# Patient Record
Sex: FEMALE | Race: WHITE | Employment: UNEMPLOYED | URBAN - NONMETROPOLITAN AREA
[De-identification: names, ages, dates, MRNs, and addresses within clinical notes are randomized per-mention and may not be internally consistent; named-entity substitution may affect disease eponyms.]

---

## 2017-05-11 ENCOUNTER — HOSPITAL ENCOUNTER (OUTPATIENT)
Age: 52
Setting detail: SPECIMEN
Discharge: HOME | End: 2017-05-11
Payer: COMMERCIAL

## 2017-05-11 ENCOUNTER — OFFICE VISIT (OUTPATIENT)
Dept: OBGYN CLINIC | Age: 52
End: 2017-05-11
Payer: COMMERCIAL

## 2017-05-11 VITALS
SYSTOLIC BLOOD PRESSURE: 131 MMHG | HEIGHT: 67 IN | WEIGHT: 293 LBS | DIASTOLIC BLOOD PRESSURE: 85 MMHG | BODY MASS INDEX: 45.99 KG/M2 | HEART RATE: 73 BPM

## 2017-05-11 DIAGNOSIS — Z01.419 WOMEN'S ANNUAL ROUTINE GYNECOLOGICAL EXAMINATION: Primary | ICD-10-CM

## 2017-05-11 DIAGNOSIS — Z01.419 WOMEN'S ANNUAL ROUTINE GYNECOLOGICAL EXAMINATION: ICD-10-CM

## 2017-05-11 DIAGNOSIS — Z12.39 SCREENING FOR BREAST CANCER: ICD-10-CM

## 2017-05-11 PROCEDURE — 99396 PREV VISIT EST AGE 40-64: CPT | Performed by: OBSTETRICS & GYNECOLOGY

## 2017-05-11 PROCEDURE — G0145 SCR C/V CYTO,THINLAYER,RESCR: HCPCS

## 2017-05-17 LAB — CYTOLOGY REPORT: NORMAL

## 2017-05-25 RX ORDER — QUINAPRIL 40 MG/1
TABLET ORAL
Qty: 90 TABLET | Refills: 1 | Status: SHIPPED | OUTPATIENT
Start: 2017-05-25 | End: 2017-10-16 | Stop reason: SDUPTHER

## 2017-05-25 RX ORDER — BUPROPION HYDROCHLORIDE 300 MG/1
TABLET ORAL
Qty: 90 TABLET | Refills: 1 | Status: SHIPPED | OUTPATIENT
Start: 2017-05-25 | End: 2017-10-16 | Stop reason: SDUPTHER

## 2017-05-25 RX ORDER — DILTIAZEM HYDROCHLORIDE 240 MG/1
CAPSULE, EXTENDED RELEASE ORAL
Qty: 90 CAPSULE | Refills: 1 | Status: SHIPPED | OUTPATIENT
Start: 2017-05-25 | End: 2017-10-16 | Stop reason: SDUPTHER

## 2017-05-25 RX ORDER — HYDROCHLOROTHIAZIDE 25 MG/1
TABLET ORAL
Qty: 90 TABLET | Refills: 1 | Status: SHIPPED | OUTPATIENT
Start: 2017-05-25 | End: 2017-10-16 | Stop reason: SDUPTHER

## 2017-05-25 RX ORDER — POTASSIUM CHLORIDE 750 MG/1
TABLET, EXTENDED RELEASE ORAL
Qty: 180 TABLET | Refills: 1 | Status: SHIPPED | OUTPATIENT
Start: 2017-05-25 | End: 2017-10-16 | Stop reason: SDUPTHER

## 2017-06-02 ENCOUNTER — HOSPITAL ENCOUNTER (OUTPATIENT)
Dept: MAMMOGRAPHY | Age: 52
Discharge: HOME | End: 2017-06-02
Payer: COMMERCIAL

## 2017-06-02 DIAGNOSIS — Z12.39 SCREENING FOR BREAST CANCER: ICD-10-CM

## 2017-06-02 PROCEDURE — G0202 SCR MAMMO BI INCL CAD: HCPCS

## 2017-10-17 RX ORDER — HYDROCHLOROTHIAZIDE 25 MG/1
TABLET ORAL
Qty: 90 TABLET | Refills: 0 | Status: SHIPPED | OUTPATIENT
Start: 2017-10-17 | End: 2018-02-05 | Stop reason: SDUPTHER

## 2017-10-17 RX ORDER — QUINAPRIL 40 MG/1
TABLET ORAL
Qty: 90 TABLET | Refills: 0 | Status: SHIPPED | OUTPATIENT
Start: 2017-10-17 | End: 2018-02-05 | Stop reason: SDUPTHER

## 2017-10-17 RX ORDER — DILTIAZEM HYDROCHLORIDE 240 MG/1
CAPSULE, EXTENDED RELEASE ORAL
Qty: 90 CAPSULE | Refills: 0 | Status: SHIPPED | OUTPATIENT
Start: 2017-10-17 | End: 2018-02-05 | Stop reason: SDUPTHER

## 2017-10-17 RX ORDER — BUPROPION HYDROCHLORIDE 300 MG/1
TABLET ORAL
Qty: 90 TABLET | Refills: 0 | Status: SHIPPED | OUTPATIENT
Start: 2017-10-17 | End: 2018-02-05 | Stop reason: SDUPTHER

## 2017-10-17 RX ORDER — POTASSIUM CHLORIDE 750 MG/1
TABLET, EXTENDED RELEASE ORAL
Qty: 180 TABLET | Refills: 0 | Status: SHIPPED | OUTPATIENT
Start: 2017-10-17 | End: 2018-02-05 | Stop reason: SDUPTHER

## 2017-10-19 ENCOUNTER — OFFICE VISIT (OUTPATIENT)
Dept: FAMILY MEDICINE CLINIC | Age: 52
End: 2017-10-19
Payer: COMMERCIAL

## 2017-10-19 VITALS
OXYGEN SATURATION: 97 % | DIASTOLIC BLOOD PRESSURE: 82 MMHG | WEIGHT: 293 LBS | SYSTOLIC BLOOD PRESSURE: 130 MMHG | HEART RATE: 78 BPM | BODY MASS INDEX: 57.48 KG/M2

## 2017-10-19 DIAGNOSIS — R63.5 WEIGHT GAIN: ICD-10-CM

## 2017-10-19 DIAGNOSIS — I10 ESSENTIAL HYPERTENSION: ICD-10-CM

## 2017-10-19 DIAGNOSIS — J45.20 MILD INTERMITTENT ASTHMA WITHOUT COMPLICATION: ICD-10-CM

## 2017-10-19 DIAGNOSIS — Z23 NEED FOR 23-POLYVALENT PNEUMOCOCCAL POLYSACCHARIDE VACCINE: ICD-10-CM

## 2017-10-19 DIAGNOSIS — F34.1 DYSTHYMIA: Primary | ICD-10-CM

## 2017-10-19 PROCEDURE — 90732 PPSV23 VACC 2 YRS+ SUBQ/IM: CPT | Performed by: FAMILY MEDICINE

## 2017-10-19 PROCEDURE — 90471 IMMUNIZATION ADMIN: CPT | Performed by: FAMILY MEDICINE

## 2017-10-19 PROCEDURE — 99214 OFFICE O/P EST MOD 30 MIN: CPT | Performed by: FAMILY MEDICINE

## 2017-10-19 NOTE — PROGRESS NOTES
HPI Notes    Name: Tawny Larkin  : 1965         Chief Complaint:     Chief Complaint   Patient presents with    Depression       History of Present Illness:      Tawny Larkin is a 46 y.o.  female who presents with Depression      Mental Health Problem   The primary symptoms include dysphoric mood. The current episode started more than 1 month ago. This is a chronic problem. The degree of incapacity that she is experiencing as a consequence of her illness is moderate. Sequelae of the illness include harmed interpersonal relations. Additional symptoms of the illness include anhedonia, unexpected weight change, fatigue and attention impairment. Additional symptoms of the illness do not include appetite change, headaches or abdominal pain. She does not admit to suicidal ideas. She does not have a plan to commit suicide. She does not contemplate harming herself. She has not already injured self. She does not contemplate injuring another person. She has not already  injured another person. Hypertension   This is a chronic problem. The current episode started more than 1 year ago. The problem is unchanged. The problem is controlled. Pertinent negatives include no chest pain, headaches, neck pain, palpitations or shortness of breath. Risk factors for coronary artery disease include obesity. Past treatments include ACE inhibitors, calcium channel blockers and diuretics. The current treatment provides significant improvement. Compliance problems include exercise and diet. Asthma   She complains of cough. There is no shortness of breath or wheezing. This is a chronic problem. The current episode started more than 1 year ago. The problem occurs intermittently. The problem has been unchanged. The cough is non-productive. Pertinent negatives include no appetite change, chest pain, ear pain, fever, headaches, myalgias or trouble swallowing.  Her symptoms are aggravated by exposure to fumes and exposure to smoke. Her symptoms are alleviated by beta-agonist. She reports significant improvement on treatment. Her past medical history is significant for asthma. Past Medical History:     Past Medical History:   Diagnosis Date    Acid reflux     Allergic rhinitis     Asthma     Fibromyalgia     Headaches, cluster     Hypertension     Osteoarthritis     hips, shoulders    Spondylolisthesis at L4-L5 level     Unspecified sleep apnea     Urinary incontinence 2011    stress incontinence      Reviewed all health maintenance requirements and ordered appropriate tests  There are no preventive care reminders to display for this patient. Past Surgical History:     Past Surgical History:   Procedure Laterality Date    COLONOSCOPY      Dr. Rian Kirby          Medications:       Prior to Admission medications    Medication Sig Start Date End Date Taking? Authorizing Provider   buPROPion (WELLBUTRIN XL) 300 MG extended release tablet TAKE 1 TABLET EVERY MORNING 10/17/17  Yes Preeti Speak, DO   DILT- MG extended release capsule TAKE 1 CAPSULE DAILY 10/17/17  Yes Preeti Speak, DO   potassium chloride (KLOR-CON M) 10 MEQ extended release tablet TAKE 1 TABLET TWICE A DAY 10/17/17  Yes Preeti Speak, DO   hydrochlorothiazide (HYDRODIURIL) 25 MG tablet TAKE 1 TABLET DAILY 10/17/17  Yes Preeti Speak, DO   quinapril (ACCUPRIL) 40 MG tablet TAKE 1 TABLET NIGHTLY 10/17/17  Yes Preeti Speak, DO   Naproxen Sodium (ALEVE PO) Take by mouth Indications: three times daily   Yes Historical Provider, MD   PROVENTIL  (90 BASE) MCG/ACT inhaler Take 1 puff by mouth Prn 2/4/16  Yes Historical Provider, MD   fluticasone (FLONASE) 50 MCG/ACT nasal spray  4/3/15  Yes Historical Provider, MD   CALCIUM PO Take  by mouth. Yes Historical Provider, MD   MAGNESIUM PO Take  by mouth. Yes Historical Provider, MD   KRILL OIL PO Take  by mouth.    Yes Historical Provider, MD Ebony James 250-50 MCG/DOSE AEPB Inhale 1 puff into the lungs 2 times daily. 7/31/12  Yes Historical Provider, MD   montelukast (SINGULAIR) 10 MG tablet Take 10 mg by mouth nightly. Yes Historical Provider, MD   Cetirizine HCl (ZYRTEC ALLERGY PO) Take  by mouth. One daily   Yes Historical Provider, MD   Omeprazole (PRILOSEC PO) Take  by mouth. Yes Historical Provider, MD   metoclopramide (REGLAN) 10 MG tablet Take 10 mg by mouth 4 times daily. Yes Historical Provider, MD   aspirin 81 MG EC tablet Take 81 mg by mouth daily. Takes 2 daily   Yes Historical Provider, MD   potassium chloride (K-DUR) 10 MEQ tablet TAKE 1 TABLET DAILY 5/16/13 9/5/13  David Otoole DO   diltiazem (CARDIZEM CD) 240 MG ER capsule Take 1 capsule by mouth daily. 10/4/12 5/16/13  Shaquille Otoole DO        Allergies:       Review of patient's allergies indicates no known allergies. Social History:     Tobacco:    reports that she has never smoked. She has never used smokeless tobacco.  Alcohol:      reports that she drinks alcohol. Drug Use:  reports that she does not use drugs. Family History:     Family History   Problem Relation Age of Onset    Arthritis Father      rhuematoid    Thyroid Disease Sister        Review of Systems:     Positive and Negative as described in HPI    Review of Systems   Constitutional: Positive for fatigue and unexpected weight change. Negative for activity change, appetite change and fever. HENT: Negative for ear discharge, ear pain and trouble swallowing. Eyes: Negative for photophobia and visual disturbance. Respiratory: Positive for cough. Negative for shortness of breath and wheezing. Cardiovascular: Negative for chest pain and palpitations. Gastrointestinal: Negative for abdominal distention, abdominal pain, constipation, diarrhea, nausea and vomiting. Endocrine: Negative for polydipsia, polyphagia and polyuria. Genitourinary: Negative for frequency and urgency.    Musculoskeletal: Negative (PNEUMOVAX 23)    Lipid Panel     Standing Status:   Future     Number of Occurrences:   1     Standing Expiration Date:   10/19/2018     Order Specific Question:   Is Patient Fasting?/# of Hours     Answer:   12    Comprehensive Metabolic Panel     Standing Status:   Future     Number of Occurrences:   1     Standing Expiration Date:   10/19/2018    TSH With Reflex Ft4     Standing Status:   Future     Number of Occurrences:   1     Standing Expiration Date:   10/19/2018         Patient Instructions     SURVEY:    You may be receiving a survey from [employer] regarding your visit today. Please complete the survey to enable us to provide the highest quality of care to you and your family. If you cannot score us as very good on any question, please call the office to discuss how we could have made your experience exceptional.     Thank you. Electronically signed by Pedro Galvez DO on 11/5/2017 at 5:26 PM         Completed Refills   Requested Prescriptions      No prescriptions requested or ordered in this encounter         Judit Casey received counseling on the following healthy behaviors: nutrition and exercise  Reviewed prior labs and health maintenance. Continue current medications, diet and exercise. Discussed use, benefit, and side effects of prescribed medications. Barriers to medication compliance addressed. Patient given educational materials - see patient instructions. All patient questions answered. Patient voiced understanding.

## 2017-11-02 ENCOUNTER — HOSPITAL ENCOUNTER (OUTPATIENT)
Age: 52
Discharge: HOME | End: 2017-11-02
Payer: COMMERCIAL

## 2017-11-02 DIAGNOSIS — I10 ESSENTIAL HYPERTENSION: ICD-10-CM

## 2017-11-02 DIAGNOSIS — R63.5 WEIGHT GAIN: ICD-10-CM

## 2017-11-02 DIAGNOSIS — F34.1 DYSTHYMIA: ICD-10-CM

## 2017-11-02 LAB
ALBUMIN SERPL-MCNC: 4.4 G/DL (ref 3.5–5.2)
ALBUMIN/GLOBULIN RATIO: ABNORMAL (ref 1–2.5)
ALP BLD-CCNC: 83 U/L (ref 35–104)
ALT SERPL-CCNC: 16 U/L (ref 5–33)
ANION GAP SERPL CALCULATED.3IONS-SCNC: 14 MMOL/L (ref 9–17)
AST SERPL-CCNC: 13 U/L
BILIRUB SERPL-MCNC: 0.54 MG/DL (ref 0.3–1.2)
BUN BLDV-MCNC: 16 MG/DL (ref 6–20)
BUN/CREAT BLD: 20 (ref 9–20)
CALCIUM SERPL-MCNC: 9.5 MG/DL (ref 8.6–10.4)
CHLORIDE BLD-SCNC: 101 MMOL/L (ref 98–107)
CHOLESTEROL/HDL RATIO: 3.6
CHOLESTEROL: 207 MG/DL
CO2: 25 MMOL/L (ref 20–31)
CREAT SERPL-MCNC: 0.81 MG/DL (ref 0.5–0.9)
GFR AFRICAN AMERICAN: >60 ML/MIN
GFR NON-AFRICAN AMERICAN: >60 ML/MIN
GFR SERPL CREATININE-BSD FRML MDRD: ABNORMAL ML/MIN/{1.73_M2}
GFR SERPL CREATININE-BSD FRML MDRD: ABNORMAL ML/MIN/{1.73_M2}
GLUCOSE BLD-MCNC: 118 MG/DL (ref 70–99)
HDLC SERPL-MCNC: 58 MG/DL
LDL CHOLESTEROL: 130 MG/DL (ref 0–130)
PATIENT FASTING?: YES
POTASSIUM SERPL-SCNC: 4.1 MMOL/L (ref 3.7–5.3)
SODIUM BLD-SCNC: 140 MMOL/L (ref 135–144)
TOTAL PROTEIN: 7.4 G/DL (ref 6.4–8.3)
TRIGL SERPL-MCNC: 94 MG/DL
TSH SERPL DL<=0.05 MIU/L-ACNC: 1.91 MIU/L (ref 0.3–5)
VLDLC SERPL CALC-MCNC: ABNORMAL MG/DL (ref 1–30)

## 2017-11-02 PROCEDURE — 80053 COMPREHEN METABOLIC PANEL: CPT

## 2017-11-02 PROCEDURE — 84443 ASSAY THYROID STIM HORMONE: CPT

## 2017-11-02 PROCEDURE — 36415 COLL VENOUS BLD VENIPUNCTURE: CPT

## 2017-11-02 PROCEDURE — 80061 LIPID PANEL: CPT

## 2017-11-05 ASSESSMENT — ENCOUNTER SYMPTOMS
BACK PAIN: 0
CONSTIPATION: 0
VOMITING: 0
SHORTNESS OF BREATH: 0
ABDOMINAL PAIN: 0
TROUBLE SWALLOWING: 0
WHEEZING: 0
PHOTOPHOBIA: 0
COUGH: 1
NAUSEA: 0
ABDOMINAL DISTENTION: 0
COLOR CHANGE: 0
DIARRHEA: 0

## 2017-12-27 ENCOUNTER — TELEPHONE (OUTPATIENT)
Dept: FAMILY MEDICINE CLINIC | Age: 52
End: 2017-12-27

## 2017-12-27 RX ORDER — OSELTAMIVIR PHOSPHATE 75 MG/1
75 CAPSULE ORAL 2 TIMES DAILY
Qty: 10 CAPSULE | Refills: 0 | Status: SHIPPED | OUTPATIENT
Start: 2017-12-27 | End: 2018-01-01

## 2017-12-27 NOTE — TELEPHONE ENCOUNTER
James Darby had been around her daughter and granddaughter all weekend who had tested positive for Influenzae. She said last night she started developing flu like symptoms. She has the cough, wheezing, sinus pain, body aches, and fever. She would like to know if we could call tamiflu in for her. Please let James Darby know. RA-Daisy    Health Maintenance   Topic Date Due    Colon cancer screen colonoscopy  05/05/2019    Breast cancer screen  06/02/2019    Diabetes screen  08/12/2019    Cervical cancer screen  05/11/2020    Lipid screen  11/02/2022    DTaP/Tdap/Td vaccine (2 - Td) 09/12/2026    Flu vaccine  Completed    Pneumococcal med risk  Completed    Hepatitis C screen  Addressed    HIV screen  Addressed             (applicable per patient's age: Cancer Screenings, Depression Screening, Fall Risk Screening, Immunizations)    Hemoglobin A1C (%)   Date Value   08/12/2016 5.3     LDL Cholesterol (mg/dL)   Date Value   11/02/2017 130     AST (U/L)   Date Value   11/02/2017 13     ALT (U/L)   Date Value   11/02/2017 16     BUN (mg/dL)   Date Value   11/02/2017 16      (goal A1C is < 7)   (goal LDL is <100) need 30-50% reduction from baseline     BP Readings from Last 3 Encounters:   10/19/17 130/82   05/11/17 131/85   08/05/16 102/64    (goal /80)      All Future Testing planned in [facility]:      Next Visit Date:  No future appointments.          Patient Active Problem List:     HTN (hypertension)     Asthma

## 2018-02-06 RX ORDER — DILTIAZEM HYDROCHLORIDE 240 MG/1
CAPSULE, EXTENDED RELEASE ORAL
Qty: 90 CAPSULE | Refills: 0 | Status: SHIPPED | OUTPATIENT
Start: 2018-02-06 | End: 2018-05-01 | Stop reason: SDUPTHER

## 2018-02-06 RX ORDER — POTASSIUM CHLORIDE 750 MG/1
TABLET, EXTENDED RELEASE ORAL
Qty: 180 TABLET | Refills: 0 | Status: SHIPPED | OUTPATIENT
Start: 2018-02-06 | End: 2018-05-01 | Stop reason: SDUPTHER

## 2018-02-06 RX ORDER — BUPROPION HYDROCHLORIDE 300 MG/1
TABLET ORAL
Qty: 90 TABLET | Refills: 0 | Status: SHIPPED | OUTPATIENT
Start: 2018-02-06 | End: 2018-05-01 | Stop reason: SDUPTHER

## 2018-02-06 RX ORDER — QUINAPRIL 40 MG/1
TABLET ORAL
Qty: 90 TABLET | Refills: 0 | Status: SHIPPED | OUTPATIENT
Start: 2018-02-06 | End: 2018-05-01 | Stop reason: SDUPTHER

## 2018-02-06 RX ORDER — HYDROCHLOROTHIAZIDE 25 MG/1
TABLET ORAL
Qty: 90 TABLET | Refills: 0 | Status: SHIPPED | OUTPATIENT
Start: 2018-02-06 | End: 2018-05-01 | Stop reason: SDUPTHER

## 2018-05-01 ENCOUNTER — OFFICE VISIT (OUTPATIENT)
Dept: FAMILY MEDICINE CLINIC | Age: 53
End: 2018-05-01
Payer: COMMERCIAL

## 2018-05-01 VITALS
WEIGHT: 293 LBS | HEART RATE: 74 BPM | DIASTOLIC BLOOD PRESSURE: 80 MMHG | BODY MASS INDEX: 57.95 KG/M2 | OXYGEN SATURATION: 97 % | SYSTOLIC BLOOD PRESSURE: 118 MMHG

## 2018-05-01 DIAGNOSIS — I10 ESSENTIAL HYPERTENSION: Primary | ICD-10-CM

## 2018-05-01 DIAGNOSIS — F34.1 DYSTHYMIA: ICD-10-CM

## 2018-05-01 DIAGNOSIS — J45.20 MILD INTERMITTENT ASTHMA WITHOUT COMPLICATION: ICD-10-CM

## 2018-05-01 PROCEDURE — 99214 OFFICE O/P EST MOD 30 MIN: CPT | Performed by: FAMILY MEDICINE

## 2018-05-01 RX ORDER — BUPROPION HYDROCHLORIDE 300 MG/1
TABLET ORAL
Qty: 90 TABLET | Refills: 2 | Status: SHIPPED | OUTPATIENT
Start: 2018-05-01 | End: 2018-11-30 | Stop reason: SDUPTHER

## 2018-05-01 RX ORDER — QUINAPRIL 40 MG/1
TABLET ORAL
Qty: 90 TABLET | Refills: 2 | Status: SHIPPED | OUTPATIENT
Start: 2018-05-01 | End: 2018-11-30 | Stop reason: SDUPTHER

## 2018-05-01 RX ORDER — POTASSIUM CHLORIDE 750 MG/1
TABLET, EXTENDED RELEASE ORAL
Qty: 180 TABLET | Refills: 2 | Status: SHIPPED | OUTPATIENT
Start: 2018-05-01 | End: 2018-11-30 | Stop reason: SDUPTHER

## 2018-05-01 RX ORDER — DILTIAZEM HYDROCHLORIDE 240 MG/1
CAPSULE, EXTENDED RELEASE ORAL
Qty: 90 CAPSULE | Refills: 2 | Status: SHIPPED | OUTPATIENT
Start: 2018-05-01 | End: 2018-11-30 | Stop reason: SDUPTHER

## 2018-05-01 RX ORDER — HYDROCHLOROTHIAZIDE 25 MG/1
TABLET ORAL
Qty: 90 TABLET | Refills: 2 | Status: SHIPPED | OUTPATIENT
Start: 2018-05-01 | End: 2018-11-30 | Stop reason: SDUPTHER

## 2018-05-01 ASSESSMENT — PATIENT HEALTH QUESTIONNAIRE - PHQ9
SUM OF ALL RESPONSES TO PHQ QUESTIONS 1-9: 2
2. FEELING DOWN, DEPRESSED OR HOPELESS: 1
1. LITTLE INTEREST OR PLEASURE IN DOING THINGS: 1
SUM OF ALL RESPONSES TO PHQ9 QUESTIONS 1 & 2: 2

## 2018-05-04 ASSESSMENT — ENCOUNTER SYMPTOMS
PHOTOPHOBIA: 0
ORTHOPNEA: 0
BLURRED VISION: 0
BACK PAIN: 0
VOMITING: 0
SHORTNESS OF BREATH: 0
CONSTIPATION: 0
WHEEZING: 1
COUGH: 1
ABDOMINAL PAIN: 0
DIARRHEA: 0
COLOR CHANGE: 0
TROUBLE SWALLOWING: 0
ABDOMINAL DISTENTION: 0
NAUSEA: 0

## 2018-06-14 ENCOUNTER — OFFICE VISIT (OUTPATIENT)
Dept: FAMILY MEDICINE CLINIC | Age: 53
End: 2018-06-14
Payer: COMMERCIAL

## 2018-06-14 VITALS
BODY MASS INDEX: 59.36 KG/M2 | DIASTOLIC BLOOD PRESSURE: 84 MMHG | HEART RATE: 80 BPM | OXYGEN SATURATION: 96 % | SYSTOLIC BLOOD PRESSURE: 124 MMHG | TEMPERATURE: 98.9 F | WEIGHT: 293 LBS

## 2018-06-14 DIAGNOSIS — S39.011A STRAIN OF ABDOMINAL WALL, INITIAL ENCOUNTER: Primary | ICD-10-CM

## 2018-06-14 PROCEDURE — 99213 OFFICE O/P EST LOW 20 MIN: CPT | Performed by: FAMILY MEDICINE

## 2018-06-14 ASSESSMENT — ENCOUNTER SYMPTOMS
SORE THROAT: 0
CONSTIPATION: 0
VOMITING: 0
NAUSEA: 0
RHINORRHEA: 1
COUGH: 1
ABDOMINAL PAIN: 1
BLOOD IN STOOL: 0
DIARRHEA: 0

## 2018-10-09 ENCOUNTER — HOSPITAL ENCOUNTER (OUTPATIENT)
Age: 53
Setting detail: SPECIMEN
Discharge: HOME | End: 2018-10-09
Payer: COMMERCIAL

## 2018-10-09 ENCOUNTER — OFFICE VISIT (OUTPATIENT)
Dept: OBGYN CLINIC | Age: 53
End: 2018-10-09
Payer: COMMERCIAL

## 2018-10-09 VITALS
HEIGHT: 68 IN | BODY MASS INDEX: 44.41 KG/M2 | WEIGHT: 293 LBS | SYSTOLIC BLOOD PRESSURE: 134 MMHG | DIASTOLIC BLOOD PRESSURE: 74 MMHG

## 2018-10-09 DIAGNOSIS — Z01.419 WOMEN'S ANNUAL ROUTINE GYNECOLOGICAL EXAMINATION: Primary | ICD-10-CM

## 2018-10-09 DIAGNOSIS — Z01.419 WOMEN'S ANNUAL ROUTINE GYNECOLOGICAL EXAMINATION: ICD-10-CM

## 2018-10-09 DIAGNOSIS — Z12.39 SCREENING FOR BREAST CANCER: ICD-10-CM

## 2018-10-09 PROCEDURE — G0145 SCR C/V CYTO,THINLAYER,RESCR: HCPCS

## 2018-10-09 PROCEDURE — 99396 PREV VISIT EST AGE 40-64: CPT | Performed by: OBSTETRICS & GYNECOLOGY

## 2018-10-09 RX ORDER — INFLUENZA VIRUS VACCINE 15; 15; 15; 15 UG/.5ML; UG/.5ML; UG/.5ML; UG/.5ML
SUSPENSION INTRAMUSCULAR
Refills: 0 | COMMUNITY
Start: 2018-09-26 | End: 2018-11-30 | Stop reason: ALTCHOICE

## 2018-10-09 NOTE — PROGRESS NOTES
YEARLY PHYSICAL    Date of service: 10/9/2018    Meño Fabian  Is a 48 y.o.   female    PT's PCP is: Liana Fuentes DO     : 1965                                             Subjective:       Patient's last menstrual period was 2015. Are your menses regular: not applicable    OB History    Para Term  AB Living   2 2 2     2   SAB TAB Ectopic Molar Multiple Live Births                    # Outcome Date GA Lbr Vipin/2nd Weight Sex Delivery Anes PTL Lv   2 Term            1 Term                    History   Smoking Status    Never Smoker   Smokeless Tobacco    Never Used        History   Alcohol Use    0.0 oz/week     Comment: one drink monthly       Family History   Problem Relation Age of Onset    Arthritis Father         rhuematoid    Thyroid Disease Sister        Allergies: Patient has no known allergies.       Current Outpatient Prescriptions:     FLUARIX QUADRIVALENT 0.5 ML injection, inject 0.5 milliliter intramuscularly, Disp: , Rfl: 0    SHINGRIX 50 MCG SUSR injection, inject 0.5 milliliter intramuscularly, Disp: , Rfl: 0    quinapril (ACCUPRIL) 40 MG tablet, TAKE 1 TABLET NIGHTLY, Disp: 90 tablet, Rfl: 2    buPROPion (WELLBUTRIN XL) 300 MG extended release tablet, TAKE 1 TABLET EVERY MORNING, Disp: 90 tablet, Rfl: 2    potassium chloride (KLOR-CON M) 10 MEQ extended release tablet, TAKE 1 TABLET TWICE A DAY, Disp: 180 tablet, Rfl: 2    hydrochlorothiazide (HYDRODIURIL) 25 MG tablet, TAKE 1 TABLET DAILY, Disp: 90 tablet, Rfl: 2    diltiazem (DILT-XR) 240 MG extended release capsule, TAKE 1 CAPSULE DAILY, Disp: 90 capsule, Rfl: 2    Naproxen Sodium (ALEVE PO), Take by mouth Indications: three times daily, Disp: , Rfl:     PROVENTIL  (90 BASE) MCG/ACT inhaler, Take 1 puff by mouth Prn, Disp: , Rfl:     fluticasone (FLONASE) 50 MCG/ACT nasal spray, , Disp: , Rfl:     CALCIUM PO,

## 2018-10-25 LAB — CYTOLOGY REPORT: NORMAL

## 2018-10-26 ENCOUNTER — HOSPITAL ENCOUNTER (OUTPATIENT)
Dept: MAMMOGRAPHY | Age: 53
Discharge: HOME | End: 2018-10-28
Payer: COMMERCIAL

## 2018-10-26 DIAGNOSIS — Z12.39 SCREENING FOR BREAST CANCER: ICD-10-CM

## 2018-10-26 PROCEDURE — 77067 SCR MAMMO BI INCL CAD: CPT

## 2018-11-30 ENCOUNTER — OFFICE VISIT (OUTPATIENT)
Dept: FAMILY MEDICINE CLINIC | Age: 53
End: 2018-11-30
Payer: COMMERCIAL

## 2018-11-30 VITALS
WEIGHT: 293 LBS | DIASTOLIC BLOOD PRESSURE: 70 MMHG | HEIGHT: 68 IN | SYSTOLIC BLOOD PRESSURE: 110 MMHG | BODY MASS INDEX: 44.41 KG/M2

## 2018-11-30 DIAGNOSIS — F34.1 DYSTHYMIA: ICD-10-CM

## 2018-11-30 DIAGNOSIS — I10 ESSENTIAL HYPERTENSION: Primary | ICD-10-CM

## 2018-11-30 DIAGNOSIS — R73.01 IFG (IMPAIRED FASTING GLUCOSE): ICD-10-CM

## 2018-11-30 DIAGNOSIS — J45.40 MODERATE PERSISTENT ASTHMA WITHOUT COMPLICATION: ICD-10-CM

## 2018-11-30 PROCEDURE — 99214 OFFICE O/P EST MOD 30 MIN: CPT | Performed by: FAMILY MEDICINE

## 2018-11-30 RX ORDER — DILTIAZEM HYDROCHLORIDE 240 MG/1
CAPSULE, EXTENDED RELEASE ORAL
Qty: 90 CAPSULE | Refills: 2 | Status: SHIPPED | OUTPATIENT
Start: 2018-11-30 | End: 2019-07-23 | Stop reason: SDUPTHER

## 2018-11-30 RX ORDER — BUPROPION HYDROCHLORIDE 300 MG/1
TABLET ORAL
Qty: 90 TABLET | Refills: 2 | Status: SHIPPED | OUTPATIENT
Start: 2018-11-30 | End: 2019-07-23 | Stop reason: SDUPTHER

## 2018-11-30 RX ORDER — QUINAPRIL 40 MG/1
TABLET ORAL
Qty: 90 TABLET | Refills: 2 | Status: SHIPPED | OUTPATIENT
Start: 2018-11-30 | End: 2019-07-23 | Stop reason: SDUPTHER

## 2018-11-30 RX ORDER — HYDROCHLOROTHIAZIDE 25 MG/1
TABLET ORAL
Qty: 90 TABLET | Refills: 2 | Status: SHIPPED | OUTPATIENT
Start: 2018-11-30 | End: 2019-07-23 | Stop reason: SDUPTHER

## 2018-11-30 RX ORDER — POTASSIUM CHLORIDE 750 MG/1
TABLET, EXTENDED RELEASE ORAL
Qty: 180 TABLET | Refills: 2 | Status: SHIPPED | OUTPATIENT
Start: 2018-11-30 | End: 2019-07-23 | Stop reason: SDUPTHER

## 2018-11-30 ASSESSMENT — PATIENT HEALTH QUESTIONNAIRE - PHQ9
SUM OF ALL RESPONSES TO PHQ QUESTIONS 1-9: 0
SUM OF ALL RESPONSES TO PHQ9 QUESTIONS 1 & 2: 0
2. FEELING DOWN, DEPRESSED OR HOPELESS: 0
1. LITTLE INTEREST OR PLEASURE IN DOING THINGS: 0
SUM OF ALL RESPONSES TO PHQ QUESTIONS 1-9: 0

## 2018-11-30 ASSESSMENT — ENCOUNTER SYMPTOMS: GASTROINTESTINAL NEGATIVE: 1

## 2018-11-30 NOTE — PATIENT INSTRUCTIONS
SURVEY:    You may be receiving a survey from [employer] regarding your visit today. Please complete the survey to enable us to provide the highest quality of care to you and your family. If you cannot score us as very good on any question, please call the office to discuss how we could have made your experience exceptional.     Thank you.

## 2018-11-30 NOTE — PROGRESS NOTES
Name: Simran Mantilla  : 1965         Chief Complaint:     Chief Complaint   Patient presents with    Hypertension       History of Present Illness:      Simran Mantilla is a 48 y.o.  female who presents with Hypertension      HPI     F/u asthma. Doing well, sees Dr Clint Walden who prescribes singulair, advair, also treatas her HENRY with CPAP. Had a recent cold from which she seems to be recovering well. She did have a little bit of an asthma flare with her cold, which is typical for her. She had a good on frequent albuterol for a few days but is doing well now. F/u HTN. Doing well. Compliant with medications, tolerating well. Denies any chest pain, shortness of breath, loss of exercise tolerance. Chronic low back pain, spondy and DDD. Has hip problems which she relates to this also. Does PT exercises and ices her back every night. F/u depression. Has been [place],  left for someone else 2 yrs ago. Has good family support. Improving overall. Gets spacy at times, maria guadalupe when anxious, which seems r/t meds. Never unresponsive. Sometimes has to pull over for a minute if in heavy traffic, overwhelmed. Past Medical History:     Past Medical History:   Diagnosis Date    Acid reflux     Allergic rhinitis     Asthma     Fibromyalgia     Headaches, cluster     Hypertension     Osteoarthritis     hips, shoulders    Spondylolisthesis at L4-L5 level     Unspecified sleep apnea     Urinary incontinence     stress incontinence        Past Surgical History:     Past Surgical History:   Procedure Laterality Date    COLONOSCOPY      Dr. Nava Faust          Medications:       Prior to Admission medications    Medication Sig Start Date End Date Taking?  Authorizing Provider   quinapril (ACCUPRIL) 40 MG tablet TAKE 1 TABLET NIGHTLY 18  Yes Segun Sweet, DO   buPROPion (WELLBUTRIN XL) 300 MG extended release tablet TAKE 1 TABLET EVERY MORNING 18  Yes Segun Sweet, DO   potassium chloride (KLOR-CON M) 10 MEQ extended release tablet TAKE 1 TABLET TWICE A DAY 11/30/18  Yes Altagracia Brara, DO   hydrochlorothiazide (HYDRODIURIL) 25 MG tablet TAKE 1 TABLET DAILY 11/30/18  Yes Altagracia Ian, DO   diltiazem (DILT-XR) 240 MG extended release capsule TAKE 1 CAPSULE DAILY 11/30/18  Yes Altagracia Brara, DO   Naproxen Sodium (ALEVE PO) Take by mouth Indications: three times daily   Yes Historical Provider, MD   PROVENTIL  (90 BASE) MCG/ACT inhaler Take 1 puff by mouth Prn 2/4/16  Yes Historical Provider, MD   fluticasone (FLONASE) 50 MCG/ACT nasal spray  4/3/15  Yes Historical Provider, MD   CALCIUM PO Take  by mouth. Yes Historical Provider, MD   MAGNESIUM PO Take  by mouth. Yes Historical Provider, MD   KRILL OIL PO Take  by mouth. Yes Historical Provider, MD   ADVAIR DISKUS 250-50 MCG/DOSE AEPB Inhale 1 puff into the lungs 2 times daily. 7/31/12  Yes Historical Provider, MD   montelukast (SINGULAIR) 10 MG tablet Take 10 mg by mouth nightly. Yes Historical Provider, MD   Cetirizine HCl (ZYRTEC ALLERGY PO) Take  by mouth. One daily   Yes Historical Provider, MD   Omeprazole (PRILOSEC PO) Take  by mouth. Yes Historical Provider, MD   metoclopramide (REGLAN) 10 MG tablet Take 10 mg by mouth 4 times daily. Yes Historical Provider, MD   aspirin 81 MG EC tablet Take 81 mg by mouth daily. Takes 2 daily   Yes Historical Provider, MD   potassium chloride (K-DUR) 10 MEQ tablet TAKE 1 TABLET DAILY 5/16/13 9/5/13  Wess Aschoff A Jump, DO   diltiazem (CARDIZEM CD) 240 MG ER capsule Take 1 capsule by mouth daily. 10/4/12 5/16/13  Shaquille Otoole, DO        Allergies:       Patient has no known allergies. Social History:     Tobacco:    reports that she has never smoked. She has never used smokeless tobacco.  Alcohol:      reports that she drinks alcohol. Drug Use:  reports that she does not use drugs.     Family History:     Family History   Problem Relation Age of Onset   

## 2018-12-10 ENCOUNTER — HOSPITAL ENCOUNTER (OUTPATIENT)
Age: 53
Discharge: HOME | End: 2018-12-10
Payer: COMMERCIAL

## 2018-12-10 DIAGNOSIS — I10 ESSENTIAL HYPERTENSION: ICD-10-CM

## 2018-12-10 DIAGNOSIS — R73.01 IFG (IMPAIRED FASTING GLUCOSE): ICD-10-CM

## 2018-12-10 LAB
ALBUMIN SERPL-MCNC: 4.5 G/DL (ref 3.5–5.2)
ALBUMIN/GLOBULIN RATIO: ABNORMAL (ref 1–2.5)
ALP BLD-CCNC: 82 U/L (ref 35–104)
ALT SERPL-CCNC: 17 U/L (ref 5–33)
ANION GAP SERPL CALCULATED.3IONS-SCNC: 14 MMOL/L (ref 9–17)
AST SERPL-CCNC: 18 U/L
BILIRUB SERPL-MCNC: 0.51 MG/DL (ref 0.3–1.2)
BUN BLDV-MCNC: 20 MG/DL (ref 6–20)
BUN/CREAT BLD: 25 (ref 9–20)
CALCIUM SERPL-MCNC: 9.6 MG/DL (ref 8.6–10.4)
CHLORIDE BLD-SCNC: 101 MMOL/L (ref 98–107)
CHOLESTEROL/HDL RATIO: 3.3
CHOLESTEROL: 198 MG/DL
CO2: 23 MMOL/L (ref 20–31)
CREAT SERPL-MCNC: 0.79 MG/DL (ref 0.5–0.9)
ESTIMATED AVERAGE GLUCOSE: 100 MG/DL
GFR AFRICAN AMERICAN: >60 ML/MIN
GFR NON-AFRICAN AMERICAN: >60 ML/MIN
GFR SERPL CREATININE-BSD FRML MDRD: ABNORMAL ML/MIN/{1.73_M2}
GFR SERPL CREATININE-BSD FRML MDRD: ABNORMAL ML/MIN/{1.73_M2}
GLUCOSE BLD-MCNC: 116 MG/DL (ref 70–99)
HBA1C MFR BLD: 5.1 % (ref 4.8–5.9)
HDLC SERPL-MCNC: 60 MG/DL
LDL CHOLESTEROL: 112 MG/DL (ref 0–130)
PATIENT FASTING?: YES
POTASSIUM SERPL-SCNC: 4 MMOL/L (ref 3.7–5.3)
SODIUM BLD-SCNC: 138 MMOL/L (ref 135–144)
TOTAL PROTEIN: 7.6 G/DL (ref 6.4–8.3)
TRIGL SERPL-MCNC: 132 MG/DL
VLDLC SERPL CALC-MCNC: NORMAL MG/DL (ref 1–30)

## 2018-12-10 PROCEDURE — 83036 HEMOGLOBIN GLYCOSYLATED A1C: CPT

## 2018-12-10 PROCEDURE — 80061 LIPID PANEL: CPT

## 2018-12-10 PROCEDURE — 36415 COLL VENOUS BLD VENIPUNCTURE: CPT

## 2018-12-10 PROCEDURE — 80053 COMPREHEN METABOLIC PANEL: CPT

## 2019-07-23 RX ORDER — HYDROCHLOROTHIAZIDE 25 MG/1
TABLET ORAL
Qty: 90 TABLET | Refills: 2 | Status: SHIPPED | OUTPATIENT
Start: 2019-07-23 | End: 2019-08-14 | Stop reason: SDUPTHER

## 2019-07-23 RX ORDER — DILTIAZEM HYDROCHLORIDE 240 MG/1
CAPSULE, EXTENDED RELEASE ORAL
Qty: 90 CAPSULE | Refills: 2 | Status: SHIPPED | OUTPATIENT
Start: 2019-07-23 | End: 2019-08-14 | Stop reason: SDUPTHER

## 2019-07-23 RX ORDER — QUINAPRIL 40 MG/1
TABLET ORAL
Qty: 90 TABLET | Refills: 2 | Status: SHIPPED | OUTPATIENT
Start: 2019-07-23 | End: 2019-08-14 | Stop reason: SDUPTHER

## 2019-07-23 RX ORDER — POTASSIUM CHLORIDE 750 MG/1
TABLET, EXTENDED RELEASE ORAL
Qty: 180 TABLET | Refills: 2 | Status: SHIPPED | OUTPATIENT
Start: 2019-07-23 | End: 2019-08-14 | Stop reason: SDUPTHER

## 2019-07-23 RX ORDER — BUPROPION HYDROCHLORIDE 300 MG/1
TABLET ORAL
Qty: 90 TABLET | Refills: 2 | Status: SHIPPED | OUTPATIENT
Start: 2019-07-23 | End: 2019-08-14 | Stop reason: SDUPTHER

## 2019-08-14 RX ORDER — HYDROCHLOROTHIAZIDE 25 MG/1
TABLET ORAL
Qty: 90 TABLET | Refills: 2 | Status: SHIPPED | OUTPATIENT
Start: 2019-08-14 | End: 2020-03-12 | Stop reason: SDUPTHER

## 2019-08-14 RX ORDER — QUINAPRIL 40 MG/1
TABLET ORAL
Qty: 90 TABLET | Refills: 2 | Status: SHIPPED | OUTPATIENT
Start: 2019-08-14 | End: 2020-03-12 | Stop reason: SDUPTHER

## 2019-08-14 RX ORDER — DILTIAZEM HYDROCHLORIDE 240 MG/1
CAPSULE, EXTENDED RELEASE ORAL
Qty: 90 CAPSULE | Refills: 2 | Status: SHIPPED | OUTPATIENT
Start: 2019-08-14 | End: 2020-03-12 | Stop reason: SDUPTHER

## 2019-08-14 RX ORDER — BUPROPION HYDROCHLORIDE 300 MG/1
TABLET ORAL
Qty: 90 TABLET | Refills: 2 | Status: SHIPPED | OUTPATIENT
Start: 2019-08-14 | End: 2020-03-12 | Stop reason: SDUPTHER

## 2019-08-14 RX ORDER — POTASSIUM CHLORIDE 750 MG/1
TABLET, EXTENDED RELEASE ORAL
Qty: 180 TABLET | Refills: 2 | Status: SHIPPED | OUTPATIENT
Start: 2019-08-14 | End: 2020-03-12 | Stop reason: SDUPTHER

## 2019-08-20 ENCOUNTER — OFFICE VISIT (OUTPATIENT)
Dept: FAMILY MEDICINE CLINIC | Age: 54
End: 2019-08-20
Payer: COMMERCIAL

## 2019-08-20 VITALS
OXYGEN SATURATION: 98 % | DIASTOLIC BLOOD PRESSURE: 68 MMHG | SYSTOLIC BLOOD PRESSURE: 110 MMHG | HEART RATE: 76 BPM | HEIGHT: 68 IN | BODY MASS INDEX: 44.41 KG/M2 | WEIGHT: 293 LBS

## 2019-08-20 DIAGNOSIS — Z12.12 SCREENING FOR COLORECTAL CANCER: ICD-10-CM

## 2019-08-20 DIAGNOSIS — I10 ESSENTIAL HYPERTENSION: ICD-10-CM

## 2019-08-20 DIAGNOSIS — R25.2 NOCTURNAL MUSCLE CRAMP: Primary | ICD-10-CM

## 2019-08-20 DIAGNOSIS — L81.9 DISCOLORATION OF SKIN OF FOOT: ICD-10-CM

## 2019-08-20 DIAGNOSIS — Z12.11 SCREENING FOR COLORECTAL CANCER: ICD-10-CM

## 2019-08-20 DIAGNOSIS — F34.1 DYSTHYMIA: ICD-10-CM

## 2019-08-20 DIAGNOSIS — Z12.39 SCREENING FOR MALIGNANT NEOPLASM OF BREAST: ICD-10-CM

## 2019-08-20 DIAGNOSIS — Z13.6 SCREENING FOR CARDIOVASCULAR CONDITION: ICD-10-CM

## 2019-08-20 PROCEDURE — 99214 OFFICE O/P EST MOD 30 MIN: CPT | Performed by: FAMILY MEDICINE

## 2019-08-20 ASSESSMENT — PATIENT HEALTH QUESTIONNAIRE - PHQ9
SUM OF ALL RESPONSES TO PHQ9 QUESTIONS 1 & 2: 2
1. LITTLE INTEREST OR PLEASURE IN DOING THINGS: 1
SUM OF ALL RESPONSES TO PHQ QUESTIONS 1-9: 2
SUM OF ALL RESPONSES TO PHQ QUESTIONS 1-9: 2
2. FEELING DOWN, DEPRESSED OR HOPELESS: 1

## 2019-08-20 ASSESSMENT — ENCOUNTER SYMPTOMS
RESPIRATORY NEGATIVE: 1
GASTROINTESTINAL NEGATIVE: 1

## 2019-08-20 NOTE — PATIENT INSTRUCTIONS
SURVEY:    You may be receiving a survey from [email or web address] regarding your visit today. Please complete the survey to enable us to provide the highest quality of care to you and your family. If you cannot score us as very good on any question, please call the office to discuss how we could have made your experience exceptional.     Thank you.

## 2019-09-12 ENCOUNTER — OFFICE VISIT (OUTPATIENT)
Dept: SURGERY | Age: 54
End: 2019-09-12

## 2019-09-12 VITALS
SYSTOLIC BLOOD PRESSURE: 120 MMHG | DIASTOLIC BLOOD PRESSURE: 80 MMHG | HEIGHT: 68 IN | WEIGHT: 293 LBS | HEART RATE: 88 BPM | RESPIRATION RATE: 20 BRPM | BODY MASS INDEX: 44.41 KG/M2

## 2019-09-12 DIAGNOSIS — Z12.11 ENCOUNTER FOR SCREENING COLONOSCOPY: Primary | ICD-10-CM

## 2019-09-12 DIAGNOSIS — Z01.818 PRE-OP TESTING: ICD-10-CM

## 2019-09-12 DIAGNOSIS — Z80.0 FAMILY HISTORY OF COLON CANCER: ICD-10-CM

## 2019-09-12 DIAGNOSIS — Z86.010 HISTORY OF COLON POLYPS: ICD-10-CM

## 2019-09-12 PROCEDURE — 99999 PR OFFICE/OUTPT VISIT,PROCEDURE ONLY: CPT | Performed by: SURGERY

## 2019-09-12 RX ORDER — SODIUM, POTASSIUM,MAG SULFATES 17.5-3.13G
1 SOLUTION, RECONSTITUTED, ORAL ORAL ONCE
Qty: 2 BOTTLE | Refills: 0 | Status: SHIPPED | OUTPATIENT
Start: 2019-09-12 | End: 2019-09-12

## 2019-09-25 ENCOUNTER — HOSPITAL ENCOUNTER (OUTPATIENT)
Age: 54
Discharge: HOME | End: 2019-09-25
Payer: COMMERCIAL

## 2019-09-25 DIAGNOSIS — Z01.818 PRE-OP TESTING: ICD-10-CM

## 2019-09-25 PROCEDURE — 93005 ELECTROCARDIOGRAM TRACING: CPT

## 2019-09-26 LAB
EKG ATRIAL RATE: 78 BPM
EKG P AXIS: 46 DEGREES
EKG P-R INTERVAL: 152 MS
EKG Q-T INTERVAL: 404 MS
EKG QRS DURATION: 96 MS
EKG QTC CALCULATION (BAZETT): 460 MS
EKG R AXIS: 11 DEGREES
EKG T AXIS: 18 DEGREES
EKG VENTRICULAR RATE: 78 BPM

## 2019-09-26 PROCEDURE — 93010 ELECTROCARDIOGRAM REPORT: CPT | Performed by: INTERNAL MEDICINE

## 2019-10-01 ENCOUNTER — ANESTHESIA EVENT (OUTPATIENT)
Dept: ENDOSCOPY | Age: 54
End: 2019-10-01
Payer: COMMERCIAL

## 2019-10-06 RX ORDER — SODIUM CHLORIDE 0.9 % (FLUSH) 0.9 %
10 SYRINGE (ML) INJECTION EVERY 12 HOURS SCHEDULED
Status: CANCELLED | OUTPATIENT
Start: 2019-10-06

## 2019-10-06 ASSESSMENT — ENCOUNTER SYMPTOMS
ABDOMINAL PAIN: 0
CHOKING: 0
SORE THROAT: 0
COUGH: 0
VOMITING: 0
BLOOD IN STOOL: 0
TROUBLE SWALLOWING: 0
BACK PAIN: 1
SHORTNESS OF BREATH: 0
NAUSEA: 0

## 2019-10-07 ENCOUNTER — ANESTHESIA (OUTPATIENT)
Dept: ENDOSCOPY | Age: 54
End: 2019-10-07
Payer: COMMERCIAL

## 2019-10-07 ENCOUNTER — HOSPITAL ENCOUNTER (OUTPATIENT)
Age: 54
Setting detail: OUTPATIENT SURGERY
Discharge: HOME | End: 2019-10-07
Attending: SURGERY | Admitting: SURGERY
Payer: COMMERCIAL

## 2019-10-07 VITALS
SYSTOLIC BLOOD PRESSURE: 148 MMHG | DIASTOLIC BLOOD PRESSURE: 81 MMHG | HEART RATE: 88 BPM | TEMPERATURE: 98.2 F | OXYGEN SATURATION: 97 % | RESPIRATION RATE: 16 BRPM | WEIGHT: 293 LBS | BODY MASS INDEX: 47.09 KG/M2 | HEIGHT: 66 IN

## 2019-10-07 VITALS
SYSTOLIC BLOOD PRESSURE: 119 MMHG | DIASTOLIC BLOOD PRESSURE: 67 MMHG | RESPIRATION RATE: 18 BRPM | OXYGEN SATURATION: 100 %

## 2019-10-07 PROBLEM — Z86.0100 HISTORY OF COLON POLYPS: Status: ACTIVE | Noted: 2019-10-07

## 2019-10-07 PROBLEM — Z86.010 HISTORY OF COLON POLYPS: Status: ACTIVE | Noted: 2019-10-07

## 2019-10-07 PROCEDURE — 7100000010 HC PHASE II RECOVERY - FIRST 15 MIN: Performed by: SURGERY

## 2019-10-07 PROCEDURE — 6360000002 HC RX W HCPCS: Performed by: NURSE ANESTHETIST, CERTIFIED REGISTERED

## 2019-10-07 PROCEDURE — 2709999900 HC NON-CHARGEABLE SUPPLY: Performed by: SURGERY

## 2019-10-07 PROCEDURE — 2500000003 HC RX 250 WO HCPCS: Performed by: NURSE ANESTHETIST, CERTIFIED REGISTERED

## 2019-10-07 PROCEDURE — 3609027000 HC COLONOSCOPY: Performed by: SURGERY

## 2019-10-07 PROCEDURE — 3700000000 HC ANESTHESIA ATTENDED CARE: Performed by: SURGERY

## 2019-10-07 PROCEDURE — 2580000003 HC RX 258: Performed by: SURGERY

## 2019-10-07 PROCEDURE — 3700000001 HC ADD 15 MINUTES (ANESTHESIA): Performed by: SURGERY

## 2019-10-07 PROCEDURE — 7100000011 HC PHASE II RECOVERY - ADDTL 15 MIN: Performed by: SURGERY

## 2019-10-07 RX ORDER — GLYCOPYRROLATE 1 MG/5 ML
SYRINGE (ML) INTRAVENOUS PRN
Status: DISCONTINUED | OUTPATIENT
Start: 2019-10-07 | End: 2019-10-07 | Stop reason: SDUPTHER

## 2019-10-07 RX ORDER — SODIUM CHLORIDE, SODIUM LACTATE, POTASSIUM CHLORIDE, CALCIUM CHLORIDE 600; 310; 30; 20 MG/100ML; MG/100ML; MG/100ML; MG/100ML
INJECTION, SOLUTION INTRAVENOUS CONTINUOUS
Status: DISCONTINUED | OUTPATIENT
Start: 2019-10-07 | End: 2019-10-07 | Stop reason: SDUPTHER

## 2019-10-07 RX ORDER — PROPOFOL 10 MG/ML
INJECTION, EMULSION INTRAVENOUS PRN
Status: DISCONTINUED | OUTPATIENT
Start: 2019-10-07 | End: 2019-10-07 | Stop reason: SDUPTHER

## 2019-10-07 RX ORDER — LIDOCAINE HYDROCHLORIDE 10 MG/ML
INJECTION, SOLUTION EPIDURAL; INFILTRATION; INTRACAUDAL; PERINEURAL PRN
Status: DISCONTINUED | OUTPATIENT
Start: 2019-10-07 | End: 2019-10-07 | Stop reason: SDUPTHER

## 2019-10-07 RX ORDER — SODIUM CHLORIDE 0.9 % (FLUSH) 0.9 %
10 SYRINGE (ML) INJECTION EVERY 12 HOURS SCHEDULED
Status: DISCONTINUED | OUTPATIENT
Start: 2019-10-07 | End: 2019-10-07 | Stop reason: HOSPADM

## 2019-10-07 RX ORDER — SODIUM CHLORIDE 0.9 % (FLUSH) 0.9 %
10 SYRINGE (ML) INJECTION PRN
Status: DISCONTINUED | OUTPATIENT
Start: 2019-10-07 | End: 2019-10-07 | Stop reason: HOSPADM

## 2019-10-07 RX ORDER — SODIUM CHLORIDE, SODIUM LACTATE, POTASSIUM CHLORIDE, CALCIUM CHLORIDE 600; 310; 30; 20 MG/100ML; MG/100ML; MG/100ML; MG/100ML
INJECTION, SOLUTION INTRAVENOUS CONTINUOUS
Status: DISCONTINUED | OUTPATIENT
Start: 2019-10-07 | End: 2019-10-07 | Stop reason: HOSPADM

## 2019-10-07 RX ORDER — PROPOFOL 10 MG/ML
INJECTION, EMULSION INTRAVENOUS CONTINUOUS PRN
Status: DISCONTINUED | OUTPATIENT
Start: 2019-10-07 | End: 2019-10-07 | Stop reason: SDUPTHER

## 2019-10-07 RX ORDER — 0.9 % SODIUM CHLORIDE 0.9 %
10 VIAL (ML) INJECTION PRN
Status: DISCONTINUED | OUTPATIENT
Start: 2019-10-07 | End: 2019-10-07 | Stop reason: HOSPADM

## 2019-10-07 RX ORDER — ONDANSETRON 2 MG/ML
4 INJECTION INTRAMUSCULAR; INTRAVENOUS EVERY 6 HOURS PRN
Status: DISCONTINUED | OUTPATIENT
Start: 2019-10-07 | End: 2019-10-07 | Stop reason: HOSPADM

## 2019-10-07 RX ADMIN — SODIUM CHLORIDE, POTASSIUM CHLORIDE, SODIUM LACTATE AND CALCIUM CHLORIDE: 600; 310; 30; 20 INJECTION, SOLUTION INTRAVENOUS at 09:22

## 2019-10-07 RX ADMIN — PROPOFOL 30 MG: 10 INJECTION, EMULSION INTRAVENOUS at 10:43

## 2019-10-07 RX ADMIN — PROPOFOL 120 MCG/KG/MIN: 10 INJECTION, EMULSION INTRAVENOUS at 10:45

## 2019-10-07 RX ADMIN — LIDOCAINE HYDROCHLORIDE 60 MG: 10 INJECTION, SOLUTION EPIDURAL; INFILTRATION; INTRACAUDAL; PERINEURAL at 10:43

## 2019-10-07 RX ADMIN — Medication 0.4 MG: at 10:45

## 2019-10-07 ASSESSMENT — PAIN SCALES - GENERAL: PAINLEVEL_OUTOF10: 0

## 2019-10-07 ASSESSMENT — PAIN - FUNCTIONAL ASSESSMENT: PAIN_FUNCTIONAL_ASSESSMENT: 0-10

## 2019-10-17 ENCOUNTER — OFFICE VISIT (OUTPATIENT)
Dept: SURGERY | Age: 54
End: 2019-10-17
Payer: COMMERCIAL

## 2019-10-17 VITALS — HEIGHT: 66 IN | WEIGHT: 293 LBS | BODY MASS INDEX: 47.09 KG/M2

## 2019-10-17 DIAGNOSIS — Z80.0 FAMILY HISTORY OF COLON CANCER: ICD-10-CM

## 2019-10-17 DIAGNOSIS — Z98.890 S/P COLONOSCOPY: Primary | ICD-10-CM

## 2019-10-17 DIAGNOSIS — K57.30 SIGMOID DIVERTICULOSIS: ICD-10-CM

## 2019-10-17 DIAGNOSIS — Z86.010 HISTORY OF COLON POLYPS: ICD-10-CM

## 2019-10-17 PROCEDURE — 99212 OFFICE O/P EST SF 10 MIN: CPT | Performed by: SURGERY

## 2019-10-17 ASSESSMENT — ENCOUNTER SYMPTOMS
SORE THROAT: 0
CHOKING: 0
ABDOMINAL PAIN: 0
BLOOD IN STOOL: 0
TROUBLE SWALLOWING: 0
SHORTNESS OF BREATH: 0
NAUSEA: 0
VOMITING: 0
COUGH: 0
BACK PAIN: 0

## 2019-11-14 ENCOUNTER — HOSPITAL ENCOUNTER (OUTPATIENT)
Dept: MAMMOGRAPHY | Age: 54
Discharge: HOME | End: 2019-11-16
Payer: COMMERCIAL

## 2019-11-14 DIAGNOSIS — Z12.39 SCREENING FOR MALIGNANT NEOPLASM OF BREAST: ICD-10-CM

## 2019-11-14 PROCEDURE — 77067 SCR MAMMO BI INCL CAD: CPT

## 2019-11-19 ENCOUNTER — TELEPHONE (OUTPATIENT)
Dept: FAMILY MEDICINE CLINIC | Age: 54
End: 2019-11-19

## 2019-11-19 DIAGNOSIS — R92.8 ABNORMAL MAMMOGRAM OF LEFT BREAST: Primary | ICD-10-CM

## 2019-12-03 ENCOUNTER — HOSPITAL ENCOUNTER (OUTPATIENT)
Dept: MAMMOGRAPHY | Age: 54
Discharge: HOME | End: 2019-12-05
Payer: COMMERCIAL

## 2019-12-03 ENCOUNTER — HOSPITAL ENCOUNTER (OUTPATIENT)
Dept: ULTRASOUND IMAGING | Age: 54
Discharge: HOME | End: 2019-12-05
Payer: COMMERCIAL

## 2019-12-03 ENCOUNTER — HOSPITAL ENCOUNTER (OUTPATIENT)
Age: 54
Discharge: HOME | End: 2019-12-03
Payer: COMMERCIAL

## 2019-12-03 DIAGNOSIS — R25.2 NOCTURNAL MUSCLE CRAMP: ICD-10-CM

## 2019-12-03 DIAGNOSIS — R92.8 ABNORMAL MAMMOGRAM OF LEFT BREAST: ICD-10-CM

## 2019-12-03 DIAGNOSIS — Z13.6 SCREENING FOR CARDIOVASCULAR CONDITION: ICD-10-CM

## 2019-12-03 LAB
ALBUMIN SERPL-MCNC: 4.8 G/DL (ref 3.5–5.2)
ALBUMIN/GLOBULIN RATIO: ABNORMAL (ref 1–2.5)
ALP BLD-CCNC: 93 U/L (ref 35–104)
ALT SERPL-CCNC: 26 U/L (ref 5–33)
ANION GAP SERPL CALCULATED.3IONS-SCNC: 17 MMOL/L (ref 9–17)
AST SERPL-CCNC: 21 U/L
BILIRUB SERPL-MCNC: 0.74 MG/DL (ref 0.3–1.2)
BUN BLDV-MCNC: 17 MG/DL (ref 6–20)
BUN/CREAT BLD: 19 (ref 9–20)
CALCIUM SERPL-MCNC: 10 MG/DL (ref 8.6–10.4)
CHLORIDE BLD-SCNC: 97 MMOL/L (ref 98–107)
CHOLESTEROL/HDL RATIO: 3.4
CHOLESTEROL: 227 MG/DL
CO2: 23 MMOL/L (ref 20–31)
CREAT SERPL-MCNC: 0.88 MG/DL (ref 0.5–0.9)
GFR AFRICAN AMERICAN: >60 ML/MIN
GFR NON-AFRICAN AMERICAN: >60 ML/MIN
GFR SERPL CREATININE-BSD FRML MDRD: ABNORMAL ML/MIN/{1.73_M2}
GFR SERPL CREATININE-BSD FRML MDRD: ABNORMAL ML/MIN/{1.73_M2}
GLUCOSE BLD-MCNC: 126 MG/DL (ref 70–99)
HDLC SERPL-MCNC: 66 MG/DL
LDL CHOLESTEROL: 125 MG/DL (ref 0–130)
MAGNESIUM: 2.4 MG/DL (ref 1.6–2.6)
PATIENT FASTING?: YES
POTASSIUM SERPL-SCNC: 3.9 MMOL/L (ref 3.7–5.3)
SODIUM BLD-SCNC: 137 MMOL/L (ref 135–144)
TOTAL PROTEIN: 8.4 G/DL (ref 6.4–8.3)
TRIGL SERPL-MCNC: 182 MG/DL
VLDLC SERPL CALC-MCNC: ABNORMAL MG/DL (ref 1–30)

## 2019-12-03 PROCEDURE — 80061 LIPID PANEL: CPT

## 2019-12-03 PROCEDURE — 77065 DX MAMMO INCL CAD UNI: CPT

## 2019-12-03 PROCEDURE — 36415 COLL VENOUS BLD VENIPUNCTURE: CPT

## 2019-12-03 PROCEDURE — 80053 COMPREHEN METABOLIC PANEL: CPT

## 2019-12-03 PROCEDURE — 83735 ASSAY OF MAGNESIUM: CPT

## 2019-12-10 ENCOUNTER — NURSE ONLY (OUTPATIENT)
Dept: FAMILY MEDICINE CLINIC | Age: 54
End: 2019-12-10
Payer: COMMERCIAL

## 2019-12-10 DIAGNOSIS — R73.01 ELEVATED FASTING GLUCOSE: Primary | ICD-10-CM

## 2019-12-10 LAB — HBA1C MFR BLD: 5.6 %

## 2019-12-10 PROCEDURE — 83036 HEMOGLOBIN GLYCOSYLATED A1C: CPT | Performed by: FAMILY MEDICINE

## 2020-03-12 RX ORDER — DILTIAZEM HYDROCHLORIDE 240 MG/1
CAPSULE, EXTENDED RELEASE ORAL
Qty: 90 CAPSULE | Refills: 1 | Status: SHIPPED | OUTPATIENT
Start: 2020-03-12 | End: 2020-07-02 | Stop reason: SDUPTHER

## 2020-03-12 RX ORDER — BUPROPION HYDROCHLORIDE 300 MG/1
TABLET ORAL
Qty: 90 TABLET | Refills: 1 | Status: SHIPPED | OUTPATIENT
Start: 2020-03-12 | End: 2020-07-02 | Stop reason: SDUPTHER

## 2020-03-12 RX ORDER — HYDROCHLOROTHIAZIDE 25 MG/1
TABLET ORAL
Qty: 90 TABLET | Refills: 1 | Status: SHIPPED | OUTPATIENT
Start: 2020-03-12 | End: 2020-07-02 | Stop reason: SDUPTHER

## 2020-03-12 RX ORDER — POTASSIUM CHLORIDE 750 MG/1
TABLET, EXTENDED RELEASE ORAL
Qty: 180 TABLET | Refills: 1 | Status: SHIPPED | OUTPATIENT
Start: 2020-03-12 | End: 2020-07-02 | Stop reason: SDUPTHER

## 2020-03-12 RX ORDER — QUINAPRIL 40 MG/1
TABLET ORAL
Qty: 90 TABLET | Refills: 1 | Status: SHIPPED | OUTPATIENT
Start: 2020-03-12 | End: 2020-07-02 | Stop reason: SDUPTHER

## 2020-07-02 RX ORDER — DILTIAZEM HYDROCHLORIDE 240 MG/1
CAPSULE, EXTENDED RELEASE ORAL
Qty: 90 CAPSULE | Refills: 0 | Status: SHIPPED | OUTPATIENT
Start: 2020-07-02 | End: 2020-11-12

## 2020-07-02 RX ORDER — HYDROCHLOROTHIAZIDE 25 MG/1
TABLET ORAL
Qty: 90 TABLET | Refills: 0 | Status: SHIPPED | OUTPATIENT
Start: 2020-07-02 | End: 2020-11-12

## 2020-07-02 RX ORDER — QUINAPRIL 40 MG/1
TABLET ORAL
Qty: 90 TABLET | Refills: 0 | Status: SHIPPED | OUTPATIENT
Start: 2020-07-02 | End: 2020-11-12

## 2020-07-02 RX ORDER — BUPROPION HYDROCHLORIDE 300 MG/1
TABLET ORAL
Qty: 90 TABLET | Refills: 0 | Status: SHIPPED | OUTPATIENT
Start: 2020-07-02 | End: 2020-11-12

## 2020-07-02 RX ORDER — POTASSIUM CHLORIDE 750 MG/1
TABLET, EXTENDED RELEASE ORAL
Qty: 180 TABLET | Refills: 0 | Status: SHIPPED | OUTPATIENT
Start: 2020-07-02 | End: 2020-11-12

## 2020-07-02 NOTE — TELEPHONE ENCOUNTER
Last OV: 8/20/2019  Last RX: 3-12-20   Next scheduled apt: Visit date not found  Medication pending  Pt advised she is due for a yearly check up and she will wait to schedule till after covid settles down. ..

## 2020-11-12 RX ORDER — HYDROCHLOROTHIAZIDE 25 MG/1
TABLET ORAL
Qty: 90 TABLET | Refills: 0 | Status: SHIPPED | OUTPATIENT
Start: 2020-11-12 | End: 2021-02-24 | Stop reason: SDUPTHER

## 2020-11-12 RX ORDER — POTASSIUM CHLORIDE 750 MG/1
TABLET, EXTENDED RELEASE ORAL
Qty: 180 TABLET | Refills: 0 | Status: SHIPPED | OUTPATIENT
Start: 2020-11-12 | End: 2021-02-24 | Stop reason: SDUPTHER

## 2020-11-12 RX ORDER — BUPROPION HYDROCHLORIDE 300 MG/1
TABLET ORAL
Qty: 90 TABLET | Refills: 0 | Status: SHIPPED | OUTPATIENT
Start: 2020-11-12 | End: 2021-02-24 | Stop reason: SDUPTHER

## 2020-11-12 RX ORDER — QUINAPRIL 40 MG/1
TABLET ORAL
Qty: 90 TABLET | Refills: 0 | Status: SHIPPED | OUTPATIENT
Start: 2020-11-12 | End: 2021-02-24 | Stop reason: SDUPTHER

## 2020-11-12 RX ORDER — DILTIAZEM HYDROCHLORIDE 240 MG/1
CAPSULE, EXTENDED RELEASE ORAL
Qty: 90 CAPSULE | Refills: 0 | Status: SHIPPED | OUTPATIENT
Start: 2020-11-12 | End: 2021-02-24 | Stop reason: SDUPTHER

## 2020-11-12 NOTE — TELEPHONE ENCOUNTER
Last visit:  8/20/2019  Next Visit Date:  No future appointments. Last Med refill:    Medication List:  Prior to Admission medications    Medication Sig Start Date End Date Taking? Authorizing Provider   dilTIAZem (DILT-XR) 240 MG extended release capsule TAKE 1 CAPSULE DAILY 7/2/20   Shearon Passey, DO   hydroCHLOROthiazide (HYDRODIURIL) 25 MG tablet TAKE 1 TABLET DAILY 7/2/20   Shearon Passey, DO   potassium chloride (KLOR-CON M) 10 MEQ extended release tablet TAKE 1 TABLET TWICE A DAY 7/2/20   Shearon Passey, DO   buPROPion (WELLBUTRIN XL) 300 MG extended release tablet TAKE 1 TABLET EVERY MORNING 7/2/20   Shearon Passey, DO   quinapril (ACCUPRIL) 40 MG tablet TAKE 1 TABLET NIGHTLY 7/2/20   Shearon Passey, DO   Naproxen Sodium (ALEVE PO) Take by mouth Indications: three times daily    Historical Provider, MD   PROVENTIL  (90 BASE) MCG/ACT inhaler Take 1 puff by mouth Prn 2/4/16   Historical Provider, MD   fluticasone [facility]) 50 MCG/ACT nasal spray  4/3/15   Historical Provider, MD   CALCIUM PO Take  by mouth. Historical Provider, MD   MAGNESIUM PO Take  by mouth. Historical Provider, MD   KRILL OIL PO Take  by mouth. Historical Provider, MD   potassium chloride (K-DUR) 10 MEQ tablet TAKE 1 TABLET DAILY 5/16/13 9/5/13  Shaquille Otoole,    ADVAIR DISKUS 250-50 MCG/DOSE AEPB Inhale 1 puff into the lungs 2 times daily. 7/31/12   Historical Provider, MD   diltiazem (CARDIZEM CD) 240 MG ER capsule Take 1 capsule by mouth daily. 10/4/12 5/16/13  Shaquille Otoole,    montelukast (SINGULAIR) 10 MG tablet Take 10 mg by mouth nightly. Historical Provider, MD   Cetirizine HCl (ZYRTEC ALLERGY PO) Take  by mouth. One daily    Historical Provider, MD   Omeprazole (PRILOSEC PO) Take 20 mg by mouth daily     Historical Provider, MD   metoclopramide (REGLAN) 10 MG tablet Take 10 mg by mouth 4 times daily. Historical Provider, MD   aspirin 81 MG EC tablet Take 81 mg by mouth daily.  Takes 2 daily    Historical Provider, MD       Allergies:  Patient has no known allergies.     Hemoglobin A1C (%)   Date Value   12/10/2019 5.6   12/10/2018 5.1   08/12/2016 5.3             ( goal A1C is < 7)   No results found for: LABMICR  LDL Cholesterol (mg/dL)   Date Value   12/03/2019 125   12/10/2018 112       (goal LDL is <100)   AST (U/L)   Date Value   12/03/2019 21     ALT (U/L)   Date Value   12/03/2019 26     BUN (mg/dL)   Date Value   12/03/2019 17     BP Readings from Last 3 Encounters:   10/07/19 119/67   10/07/19 (!) 148/81   09/12/19 120/80          (goal 120/80)

## 2021-02-03 ENCOUNTER — TELEPHONE (OUTPATIENT)
Dept: FAMILY MEDICINE CLINIC | Age: 56
End: 2021-02-03

## 2021-02-03 ENCOUNTER — OFFICE VISIT (OUTPATIENT)
Dept: FAMILY MEDICINE CLINIC | Age: 56
End: 2021-02-03
Payer: COMMERCIAL

## 2021-02-03 VITALS
HEART RATE: 80 BPM | BODY MASS INDEX: 45.99 KG/M2 | DIASTOLIC BLOOD PRESSURE: 88 MMHG | TEMPERATURE: 98.1 F | SYSTOLIC BLOOD PRESSURE: 132 MMHG | WEIGHT: 293 LBS | HEIGHT: 67 IN | OXYGEN SATURATION: 98 %

## 2021-02-03 DIAGNOSIS — H43.392 VITREOUS FLOATERS OF LEFT EYE: Primary | ICD-10-CM

## 2021-02-03 PROCEDURE — 99213 OFFICE O/P EST LOW 20 MIN: CPT | Performed by: STUDENT IN AN ORGANIZED HEALTH CARE EDUCATION/TRAINING PROGRAM

## 2021-02-03 SDOH — ECONOMIC STABILITY: FOOD INSECURITY: WITHIN THE PAST 12 MONTHS, YOU WORRIED THAT YOUR FOOD WOULD RUN OUT BEFORE YOU GOT MONEY TO BUY MORE.: NEVER TRUE

## 2021-02-03 SDOH — ECONOMIC STABILITY: FOOD INSECURITY: WITHIN THE PAST 12 MONTHS, THE FOOD YOU BOUGHT JUST DIDN'T LAST AND YOU DIDN'T HAVE MONEY TO GET MORE.: NEVER TRUE

## 2021-02-03 SDOH — ECONOMIC STABILITY: TRANSPORTATION INSECURITY
IN THE PAST 12 MONTHS, HAS LACK OF TRANSPORTATION KEPT YOU FROM MEETINGS, WORK, OR FROM GETTING THINGS NEEDED FOR DAILY LIVING?: NO

## 2021-02-03 SDOH — ECONOMIC STABILITY: INCOME INSECURITY: HOW HARD IS IT FOR YOU TO PAY FOR THE VERY BASICS LIKE FOOD, HOUSING, MEDICAL CARE, AND HEATING?: NOT HARD AT ALL

## 2021-02-03 ASSESSMENT — ENCOUNTER SYMPTOMS
VOMITING: 0
DIARRHEA: 0
EYE PAIN: 0
ABDOMINAL PAIN: 0
RHINORRHEA: 0
WHEEZING: 0
NAUSEA: 0
SINUS PAIN: 0
COUGH: 0
PHOTOPHOBIA: 0
BACK PAIN: 0

## 2021-02-03 ASSESSMENT — PATIENT HEALTH QUESTIONNAIRE - PHQ9
1. LITTLE INTEREST OR PLEASURE IN DOING THINGS: 0
SUM OF ALL RESPONSES TO PHQ QUESTIONS 1-9: 0

## 2021-02-03 ASSESSMENT — VISUAL ACUITY: OU: 1

## 2021-02-03 NOTE — PROGRESS NOTES
(WELLBUTRIN XL) 300 MG extended release tablet TAKE 1 TABLET EVERY MORNING 11/12/20  Yes Bj Gore,    dilTIAZem (DILT-XR) 240 MG extended release capsule TAKE 1 CAPSULE DAILY 11/12/20  Yes Bj Gore DO   quinapril (ACCUPRIL) 40 MG tablet TAKE 1 TABLET NIGHTLY 11/12/20  Yes Bj Gore DO   hydroCHLOROthiazide (HYDRODIURIL) 25 MG tablet TAKE 1 TABLET DAILY 11/12/20  Yes Bj Gore DO   potassium chloride (KLOR-CON M10) 10 MEQ extended release tablet TAKE 1 TABLET TWICE A DAY 11/12/20  Yes Bj Gore DO   Naproxen Sodium (ALEVE PO) Take by mouth Indications: three times daily   Yes Historical Provider, MD   PROVENTIL  (90 BASE) MCG/ACT inhaler Take 1 puff by mouth Prn 2/4/16  Yes Historical Provider, MD   fluticasone (FLONASE) 50 MCG/ACT nasal spray  4/3/15  Yes Historical Provider, MD   CALCIUM PO Take  by mouth. Yes Historical Provider, MD   MAGNESIUM PO Take  by mouth. Yes Historical Provider, MD   KRILL OIL PO Take  by mouth. Yes Historical Provider, MD   ADVAIR DISKUS 250-50 MCG/DOSE AEPB Inhale 1 puff into the lungs 2 times daily. 7/31/12  Yes Historical Provider, MD   montelukast (SINGULAIR) 10 MG tablet Take 10 mg by mouth nightly. Yes Historical Provider, MD   Cetirizine HCl (ZYRTEC ALLERGY PO) Take  by mouth. One daily   Yes Historical Provider, MD   Omeprazole (PRILOSEC PO) Take 20 mg by mouth daily    Yes Historical Provider, MD   metoclopramide (REGLAN) 10 MG tablet Take 10 mg by mouth 4 times daily. Yes Historical Provider, MD   aspirin 81 MG EC tablet Take 81 mg by mouth daily. Takes 2 daily   Yes Historical Provider, MD   potassium chloride (K-DUR) 10 MEQ tablet TAKE 1 TABLET DAILY 5/16/13 9/5/13  Angelia Otoole,    diltiazem (CARDIZEM CD) 240 MG ER capsule Take 1 capsule by mouth daily. 10/4/12 5/16/13  Shaquille Otoole, DO        Allergies:       Patient has no known allergies. Social History:     Tobacco:    reports that she has never smoked. She has never used smokeless tobacco.  Alcohol:      reports current alcohol use. Drug Use:  reports no history of drug use. Family History:     Family History   Problem Relation Age of Onset    Arthritis Father         rhuematoid    Thyroid Disease Sister     Cancer Maternal Grandfather         Colon Cancer       Review of Systems:         Review of Systems   Constitutional: Negative for fever. HENT: Negative for rhinorrhea, sinus pain and sneezing. Eyes: Positive for visual disturbance. Negative for photophobia and pain. Respiratory: Negative for cough and wheezing. Cardiovascular: Negative for chest pain. Gastrointestinal: Negative for abdominal pain, diarrhea, nausea and vomiting. Genitourinary: Negative for menstrual problem and vaginal discharge. Musculoskeletal: Negative for back pain. Skin: Negative for rash. Neurological: Negative for headaches. Psychiatric/Behavioral: Negative for sleep disturbance. Physical Exam:     Vitals:  /88   Pulse 80   Temp 98.1 °F (36.7 °C) (Temporal)   Ht 5' 7\" (1.702 m)   Wt (!) 377 lb (171 kg)   LMP 09/01/2015   SpO2 98%   BMI 59.05 kg/m²       Physical Exam  Vitals signs and nursing note reviewed. Constitutional:       General: She is not in acute distress. Appearance: Normal appearance. She is normal weight. Eyes:      General: Lids are normal. Vision grossly intact. Gaze aligned appropriately. No visual field deficit. Extraocular Movements: Extraocular movements intact. Right eye: Normal extraocular motion and no nystagmus. Left eye: Normal extraocular motion and no nystagmus. Skin:     General: Skin is warm and dry. Capillary Refill: Capillary refill takes less than 2 seconds. Neurological:      General: No focal deficit present. Mental Status: She is alert and oriented to person, place, and time. Mental status is at baseline. Cranial Nerves: No cranial nerve deficit.    Psychiatric: Mood and Affect: Mood normal.         Behavior: Behavior normal.         Thought Content: Thought content normal.                 Data:     Lab Results   Component Value Date     12/03/2019    K 3.9 12/03/2019    CL 97 12/03/2019    CO2 23 12/03/2019    BUN 17 12/03/2019    CREATININE 0.88 12/03/2019    GLUCOSE 126 12/03/2019    GLUCOSE 101 11/03/2011    PROT 8.4 12/03/2019    LABALBU 4.8 12/03/2019    LABALBU 4.5 11/03/2011    BILITOT 0.74 12/03/2019    ALKPHOS 93 12/03/2019    AST 21 12/03/2019    ALT 26 12/03/2019     No results found for: WBC, RBC, HGB, HCT, MCV, MCH, MCHC, RDW, PLT, MPV  Lab Results   Component Value Date    TSH 1.91 11/02/2017     Lab Results   Component Value Date    CHOL 227 12/03/2019    HDL 66 12/03/2019    LABA1C 5.6 12/10/2019          Assessment & Plan        Diagnosis Orders   1. Vitreous floaters of left eye         1. Patient's description is not concerning for PVD or retinal detachment, this is even more reassuring that her optometrist has excluded both of these entities as well. She is likely seeing simple vitreous floaters, which could be cholesterol deposits within her bloodstream.  She has an NIH stroke scale of 0, I would not recommend any advanced imaging, or any further work-up for CVA. Would recommend continued monitoring of her vision, follow-up as needed for this problem. Follow-up in 5 days for an annual well visit, as patient states she needs one. Completed Refills   Requested Prescriptions      No prescriptions requested or ordered in this encounter     Return in about 5 days (around 2/8/2021) for Well Visit (40min). No orders of the defined types were placed in this encounter. No orders of the defined types were placed in this encounter. Patient Instructions     SURVEY:    You may be receiving a survey from [employer] regarding your visit today.     Please complete the survey to enable us to provide the highest quality of

## 2021-02-24 ENCOUNTER — OFFICE VISIT (OUTPATIENT)
Dept: FAMILY MEDICINE CLINIC | Age: 56
End: 2021-02-24
Payer: COMMERCIAL

## 2021-02-24 ENCOUNTER — HOSPITAL ENCOUNTER (OUTPATIENT)
Age: 56
Discharge: HOME | End: 2021-02-24
Payer: COMMERCIAL

## 2021-02-24 VITALS
BODY MASS INDEX: 45.99 KG/M2 | DIASTOLIC BLOOD PRESSURE: 68 MMHG | HEART RATE: 96 BPM | HEIGHT: 67 IN | OXYGEN SATURATION: 98 % | SYSTOLIC BLOOD PRESSURE: 116 MMHG | WEIGHT: 293 LBS

## 2021-02-24 DIAGNOSIS — Z13.6 SCREENING FOR CARDIOVASCULAR CONDITION: ICD-10-CM

## 2021-02-24 DIAGNOSIS — R51.9 OCCIPITAL HEADACHE: ICD-10-CM

## 2021-02-24 DIAGNOSIS — H53.9 VISION CHANGES: ICD-10-CM

## 2021-02-24 DIAGNOSIS — H53.9 TRANSIENT VISION DISTURBANCE: Primary | ICD-10-CM

## 2021-02-24 DIAGNOSIS — R73.01 IFG (IMPAIRED FASTING GLUCOSE): ICD-10-CM

## 2021-02-24 DIAGNOSIS — I10 ESSENTIAL HYPERTENSION: ICD-10-CM

## 2021-02-24 LAB
ABSOLUTE EOS #: 0.3 K/UL (ref 0–0.4)
ABSOLUTE IMMATURE GRANULOCYTE: ABNORMAL K/UL (ref 0–0.3)
ABSOLUTE LYMPH #: 3 K/UL (ref 1–4.8)
ABSOLUTE MONO #: 0.9 K/UL (ref 0–1)
ALBUMIN SERPL-MCNC: 4.6 G/DL (ref 3.5–5.2)
ALBUMIN/GLOBULIN RATIO: ABNORMAL (ref 1–2.5)
ALP BLD-CCNC: 91 U/L (ref 35–104)
ALT SERPL-CCNC: 18 U/L (ref 5–33)
ANION GAP SERPL CALCULATED.3IONS-SCNC: 13 MMOL/L (ref 9–17)
AST SERPL-CCNC: 17 U/L
BASOPHILS # BLD: 0 % (ref 0–2)
BASOPHILS ABSOLUTE: 0.1 K/UL (ref 0–0.2)
BILIRUB SERPL-MCNC: 0.58 MG/DL (ref 0.3–1.2)
BUN BLDV-MCNC: 15 MG/DL (ref 6–20)
BUN/CREAT BLD: 13 (ref 9–20)
CALCIUM SERPL-MCNC: 10.2 MG/DL (ref 8.6–10.4)
CHLORIDE BLD-SCNC: 102 MMOL/L (ref 98–107)
CHOLESTEROL/HDL RATIO: 3.2
CHOLESTEROL: 201 MG/DL
CO2: 23 MMOL/L (ref 20–31)
CREAT SERPL-MCNC: 1.12 MG/DL (ref 0.5–0.9)
DIFFERENTIAL TYPE: YES
EOSINOPHILS RELATIVE PERCENT: 2 % (ref 0–5)
GFR AFRICAN AMERICAN: >60 ML/MIN
GFR NON-AFRICAN AMERICAN: 51 ML/MIN
GFR SERPL CREATININE-BSD FRML MDRD: ABNORMAL ML/MIN/{1.73_M2}
GFR SERPL CREATININE-BSD FRML MDRD: ABNORMAL ML/MIN/{1.73_M2}
GLUCOSE BLD-MCNC: 109 MG/DL (ref 70–99)
HCT VFR BLD CALC: 44.8 % (ref 36–46)
HDLC SERPL-MCNC: 62 MG/DL
HEMOGLOBIN: 15.1 G/DL (ref 12–16)
IMMATURE GRANULOCYTES: ABNORMAL %
LDL CHOLESTEROL: 106 MG/DL (ref 0–130)
LYMPHOCYTES # BLD: 23 % (ref 15–40)
MCH RBC QN AUTO: 30 PG (ref 26–34)
MCHC RBC AUTO-ENTMCNC: 33.7 G/DL (ref 31–37)
MCV RBC AUTO: 89.3 FL (ref 80–100)
MONOCYTES # BLD: 7 % (ref 4–8)
NRBC AUTOMATED: ABNORMAL PER 100 WBC
PATIENT FASTING?: NO
PDW BLD-RTO: 14.3 % (ref 12.1–15.2)
PLATELET # BLD: 297 K/UL (ref 140–450)
PLATELET ESTIMATE: ABNORMAL
PMV BLD AUTO: ABNORMAL FL (ref 6–12)
POTASSIUM SERPL-SCNC: 3.8 MMOL/L (ref 3.7–5.3)
RBC # BLD: 5.02 M/UL (ref 4–5.2)
RBC # BLD: ABNORMAL 10*6/UL
SEG NEUTROPHILS: 68 % (ref 47–75)
SEGMENTED NEUTROPHILS ABSOLUTE COUNT: 8.8 K/UL (ref 2.5–7)
SODIUM BLD-SCNC: 138 MMOL/L (ref 135–144)
TOTAL PROTEIN: 7.4 G/DL (ref 6.4–8.3)
TRIGL SERPL-MCNC: 165 MG/DL
TSH SERPL DL<=0.05 MIU/L-ACNC: 1.33 MIU/L (ref 0.3–5)
VLDLC SERPL CALC-MCNC: ABNORMAL MG/DL (ref 1–30)
WBC # BLD: 13 K/UL (ref 3.5–11)
WBC # BLD: ABNORMAL 10*3/UL

## 2021-02-24 PROCEDURE — 80053 COMPREHEN METABOLIC PANEL: CPT

## 2021-02-24 PROCEDURE — 83036 HEMOGLOBIN GLYCOSYLATED A1C: CPT

## 2021-02-24 PROCEDURE — 84443 ASSAY THYROID STIM HORMONE: CPT

## 2021-02-24 PROCEDURE — 99214 OFFICE O/P EST MOD 30 MIN: CPT | Performed by: FAMILY MEDICINE

## 2021-02-24 PROCEDURE — 36415 COLL VENOUS BLD VENIPUNCTURE: CPT

## 2021-02-24 PROCEDURE — 80061 LIPID PANEL: CPT

## 2021-02-24 PROCEDURE — 85025 COMPLETE CBC W/AUTO DIFF WBC: CPT

## 2021-02-24 RX ORDER — BUPROPION HYDROCHLORIDE 300 MG/1
TABLET ORAL
Qty: 90 TABLET | Refills: 1 | Status: SHIPPED | OUTPATIENT
Start: 2021-02-24 | End: 2021-08-05

## 2021-02-24 RX ORDER — DILTIAZEM HYDROCHLORIDE 240 MG/1
CAPSULE, EXTENDED RELEASE ORAL
Qty: 90 CAPSULE | Refills: 1 | Status: SHIPPED | OUTPATIENT
Start: 2021-02-24 | End: 2021-08-05

## 2021-02-24 RX ORDER — QUINAPRIL 40 MG/1
TABLET ORAL
Qty: 90 TABLET | Refills: 1 | Status: SHIPPED | OUTPATIENT
Start: 2021-02-24 | End: 2021-08-05

## 2021-02-24 RX ORDER — POTASSIUM CHLORIDE 750 MG/1
TABLET, EXTENDED RELEASE ORAL
Qty: 180 TABLET | Refills: 1 | Status: SHIPPED | OUTPATIENT
Start: 2021-02-24 | End: 2021-08-05

## 2021-02-24 RX ORDER — HYDROCHLOROTHIAZIDE 25 MG/1
TABLET ORAL
Qty: 90 TABLET | Refills: 1 | Status: SHIPPED | OUTPATIENT
Start: 2021-02-24 | End: 2021-08-05

## 2021-02-24 NOTE — PROGRESS NOTES
Name: Rell Anthony  : 1965         Chief Complaint:     Chief Complaint   Patient presents with    Eye Problem       History of Present Illness:      Rell Anthony is a 54 y.o.  female who presents with Eye Problem      HPI     Patient complains of vision change and headaches. About 6 wks ago started seeing something kind of like a bright rain drop going down a pane of glass in L lateral field of vision. Only occurs in the left eye and can occur even with the eyes closed. Vision has been normal otherwise. She can still see normally along with this. It would look like the bright object is in front of whatever else she is looking at. Episodes have become increasingly more frequent. 3 days ago she did keep track and had at least 32 episodes of the vision change that day. In the past few days she has also developed significant headaches, feeling of pressure in occiput or sometimes a tight band around sides of head. Sometimes present on waking. The headaches don't particularly come along with the vision disturbance. Sometimes gets fuzzy and tingly feeling on R lower lip with headaches, which she's had for a long time. Recalls migraine with light flashes yrs ago, and this is different. Saw optometry and had thorough exam with no etiology identified. F/u HTN. Hasn't been checking at home but her blood pressure has been normal at recent appointments and she has been taking meds as directed. Asthma controlled, follows with pulmonologist Dr. Erasto Song. apryl for acid reflux that was flaring her asthma, taking BID.       Past Medical History:     Past Medical History:   Diagnosis Date    Acid reflux     Allergic rhinitis     Asthma     Fibromyalgia     Headaches, cluster     History of colon polyps     Hypertension     Osteoarthritis     hips, shoulders    Spondylolisthesis at L4-L5 level     Unspecified sleep apnea     Urinary incontinence     stress incontinence        Past Surgical History:     Past Surgical History:   Procedure Laterality Date    COLONOSCOPY      Dr. Con Joe COLONOSCOPY N/A 10/7/2019    COLONOSCOPY. Dx: Sigmoid Diverticulosis performed by Roby Johansen MD at [facility]          Medications:       Prior to Admission medications    Medication Sig Start Date End Date Taking? Authorizing Provider   buPROPion (WELLBUTRIN XL) 300 MG extended release tablet 1 by mouth every morning 2/24/21  Yes Yajaira Carbon, DO   dilTIAZem (DILT-XR) 240 MG extended release capsule TAKE 1 CAPSULE DAILY 2/24/21  Yes Yajaira Carbon, DO   quinapril (ACCUPRIL) 40 MG tablet 1 by mouth nightly 2/24/21  Yes Yajaira Carbon, DO   hydroCHLOROthiazide (HYDRODIURIL) 25 MG tablet 1 by mouth daily 2/24/21  Yes Yajaira Carbon, DO   potassium chloride (KLOR-CON M10) 10 MEQ extended release tablet TAKE 1 TABLET TWICE A DAY 2/24/21  Yes Yajaira Carbon, DO   Naproxen Sodium (ALEVE PO) Take by mouth Indications: three times daily   Yes Historical Provider, MD   PROVENTIL  (90 BASE) MCG/ACT inhaler Take 1 puff by mouth Prn 2/4/16  Yes Historical Provider, MD   fluticasone (FLONASE) 50 MCG/ACT nasal spray  4/3/15  Yes Historical Provider, MD   CALCIUM PO Take  by mouth. Yes Historical Provider, MD   MAGNESIUM PO Take  by mouth. Yes Historical Provider, MD   KRILL OIL PO Take  by mouth. Yes Historical Provider, MD   ADVAIR DISKUS 250-50 MCG/DOSE AEPB Inhale 1 puff into the lungs 2 times daily. 7/31/12  Yes Historical Provider, MD   Cetirizine HCl (ZYRTEC ALLERGY PO) Take  by mouth. One daily   Yes Historical Provider, MD   Omeprazole (PRILOSEC PO) Take 20 mg by mouth daily    Yes Historical Provider, MD   metoclopramide (REGLAN) 10 MG tablet Take 10 mg by mouth 4 times daily. Yes Historical Provider, MD   aspirin 81 MG EC tablet Take 81 mg by mouth daily.  Takes 2 daily   Yes Historical Provider, MD   potassium chloride (K-DUR) 10 MEQ tablet TAKE 1 TABLET DAILY 5/16/13 9/5/13  Mickiel Shakeel Otoole, DO   diltiazem (CARDIZEM CD) 240 MG ER capsule Take 1 capsule by mouth daily. 10/4/12 5/16/13  Shaquille Otoole, DO   montelukast (SINGULAIR) 10 MG tablet Take 10 mg by mouth nightly. Historical Provider, MD        Allergies:       Patient has no known allergies. Social History:     Tobacco:    reports that she has never smoked. She has never used smokeless tobacco.  Alcohol:      reports current alcohol use. Drug Use:  reports no history of drug use. Family History:     Family History   Problem Relation Age of Onset    Arthritis Father         rhuematoid    Thyroid Disease Sister     Cancer Maternal Grandfather         Colon Cancer       Review of Systems:     Positive and Negative as described in HPI    Review of Systems   Neurological: Negative for dizziness, speech difficulty and weakness. Physical Exam:     Vitals:  /68   Pulse 96   Ht 5' 7\" (1.702 m)   Wt (!) 382 lb (173.3 kg)   LMP 09/01/2015   SpO2 98%   BMI 59.83 kg/m²   Physical Exam  Vitals signs and nursing note reviewed. Constitutional:       General: She is not in acute distress. Appearance: Normal appearance. She is well-developed. She is not ill-appearing. HENT:      Head: Normocephalic and atraumatic. Right Ear: Tympanic membrane and ear canal normal.      Left Ear: Tympanic membrane and ear canal normal.      Nose: Nose normal.      Mouth/Throat:      Mouth: Mucous membranes are moist.      Pharynx: Oropharynx is clear. Eyes:      Conjunctiva/sclera: Conjunctivae normal.      Pupils: Pupils are equal, round, and reactive to light. Neck:      Musculoskeletal: Neck supple. Vascular: No carotid bruit. Cardiovascular:      Rate and Rhythm: Normal rate and regular rhythm. Heart sounds: Normal heart sounds. Pulmonary:      Effort: Pulmonary effort is normal.      Breath sounds: Normal breath sounds. Lymphadenopathy:      Cervical: No cervical adenopathy. Skin:     General: Skin is warm and dry. Neurological:      General: No focal deficit present. Mental Status: She is alert and oriented to person, place, and time. Cranial Nerves: No cranial nerve deficit. Motor: No weakness. Psychiatric:         Mood and Affect: Mood normal.         Behavior: Behavior normal.         Data:     Lab Results   Component Value Date     12/03/2019    K 3.9 12/03/2019    CL 97 12/03/2019    CO2 23 12/03/2019    BUN 17 12/03/2019    CREATININE 0.88 12/03/2019    GLUCOSE 126 12/03/2019    GLUCOSE 101 11/03/2011    PROT 8.4 12/03/2019    LABALBU 4.8 12/03/2019    LABALBU 4.5 11/03/2011    BILITOT 0.74 12/03/2019    ALKPHOS 93 12/03/2019    AST 21 12/03/2019    ALT 26 12/03/2019     No results found for: WBC, RBC, HGB, HCT, MCV, MCH, MCHC, RDW, PLT, MPV  Lab Results   Component Value Date    TSH 1.91 11/02/2017     Lab Results   Component Value Date    CHOL 227 12/03/2019    HDL 66 12/03/2019    LABA1C 5.6 12/10/2019         Assessment & Plan:        Diagnosis Orders   1. Transient vision disturbance  MRI BRAIN W WO CONTRAST   2. Occipital headache     3. Essential hypertension     4. IFG (impaired fasting glucose)  Hemoglobin A1C   5. Screening for cardiovascular condition  Comprehensive Metabolic Panel    Lipid Panel   Onset a few weeks ago of visual disturbance in the left lateral visual field. She did have a normal exam done by the optometrist at that time, no floaters, retinal detachment, abnormality of the optic nerve. Differential diagnosis would include vascular disturbance in the brain, electrical disturbance similar to a partial seizure, mass, others. She has also developed headaches which could be caused by similar pathology. Testing ordered as above. Advised if this testing is normal then I will likely order EEG. We could also consider neurology or neuro ophthalmology referral.  Patient in agreement with plan.   Hypertension controlled, continue current Rx  Impaired fasting glucose, updating lab      Requested Prescriptions     Signed Prescriptions Disp Refills    buPROPion (WELLBUTRIN XL) 300 MG extended release tablet 90 tablet 1     Si by mouth every morning    dilTIAZem (DILT-XR) 240 MG extended release capsule 90 capsule 1     Sig: TAKE 1 CAPSULE DAILY    quinapril (ACCUPRIL) 40 MG tablet 90 tablet 1     Si by mouth nightly    hydroCHLOROthiazide (HYDRODIURIL) 25 MG tablet 90 tablet 1     Si by mouth daily    potassium chloride (KLOR-CON M10) 10 MEQ extended release tablet 180 tablet 1     Sig: TAKE 1 TABLET TWICE A DAY       Patient Instructions   SURVEY:    You may be receiving a survey from [employer] regarding your visit today. You may get this in the mail, through your MyChart or in your email. Please complete the survey to enable us to provide the highest quality of care to you and your family. If you cannot score us as very good ( 5 Stars) on any question, please feel free to call the office to discuss how we could have made your experience exceptional.     Thank you. Clinical Care Team:  DO Fatemeh Valdes Ace, [address] [place] [address] Team:  Louie Abbott received counseling on the following healthy behaviors: medication adherence  Reviewed prior labs and health maintenance. Continue current medications, diet and exercise. Discussed use, benefit, and side effects of prescribed medications. Barriers to medication compliance addressed. Patient given educational materials - see patient instructions. All patient questions answered. Patient voiced understanding.      Electronically signed by Heath Fan DO on 2021 at 3:49 PM   [address]  Dept: [phone]

## 2021-02-24 NOTE — PATIENT INSTRUCTIONS
SURVEY:    You may be receiving a survey from [employer] regarding your visit today. You may get this in the mail, through your MyChart or in your email. Please complete the survey to enable us to provide the highest quality of care to you and your family. If you cannot score us as very good ( 5 Stars) on any question, please feel free to call the office to discuss how we could have made your experience exceptional.     Thank you.     Clinical Care Team:  Dr. Venu Trammell, DO [facility], [address] [place] [address] Team:  [address] [place] [address]

## 2021-02-25 LAB
ESTIMATED AVERAGE GLUCOSE: 108 MG/DL
HBA1C MFR BLD: 5.4 % (ref 4–6)

## 2021-03-08 ENCOUNTER — HOSPITAL ENCOUNTER (OUTPATIENT)
Dept: MRI IMAGING | Age: 56
Discharge: HOME | End: 2021-03-10
Payer: COMMERCIAL

## 2021-03-08 DIAGNOSIS — H53.9 TRANSIENT VISION DISTURBANCE: ICD-10-CM

## 2021-03-08 PROCEDURE — A9577 INJ MULTIHANCE: HCPCS | Performed by: FAMILY MEDICINE

## 2021-03-08 PROCEDURE — 6360000004 HC RX CONTRAST MEDICATION: Performed by: FAMILY MEDICINE

## 2021-03-08 PROCEDURE — 70553 MRI BRAIN STEM W/O & W/DYE: CPT

## 2021-03-08 RX ADMIN — GADOBENATE DIMEGLUMINE 10 ML: 529 INJECTION, SOLUTION INTRAVENOUS at 14:12

## 2021-03-09 ENCOUNTER — TELEPHONE (OUTPATIENT)
Dept: FAMILY MEDICINE CLINIC | Age: 56
End: 2021-03-09

## 2021-03-09 DIAGNOSIS — H53.9 TRANSIENT VISION DISTURBANCE: Primary | ICD-10-CM

## 2021-03-09 DIAGNOSIS — R51.9 OCCIPITAL HEADACHE: ICD-10-CM

## 2021-03-09 NOTE — TELEPHONE ENCOUNTER
----- Message from Ana Lilia Vargas DO sent at 3/9/2021 12:43 PM EST -----  Brain MRI all okay. Recommend referral to neuro-ophthalmology. This will probably have to be in a [facility] or [facility]. If she sees the report and sees it mentioned that she has a lesion in the right occipital area, that would be a scalp cyst.  Those are benign and would not need to be removed unless causing problems.

## 2021-03-09 NOTE — TELEPHONE ENCOUNTER
Notified still having headaches and they are getting worse.  Having photophobia now, would like to go to [facility]

## 2021-08-05 RX ORDER — QUINAPRIL 40 MG/1
TABLET ORAL
Qty: 90 TABLET | Refills: 1 | Status: SHIPPED | OUTPATIENT
Start: 2021-08-05 | End: 2022-03-25

## 2021-08-05 RX ORDER — BUPROPION HYDROCHLORIDE 300 MG/1
TABLET ORAL
Qty: 90 TABLET | Refills: 1 | Status: SHIPPED | OUTPATIENT
Start: 2021-08-05 | End: 2022-03-25

## 2021-08-05 RX ORDER — HYDROCHLOROTHIAZIDE 25 MG/1
TABLET ORAL
Qty: 90 TABLET | Refills: 1 | Status: SHIPPED | OUTPATIENT
Start: 2021-08-05 | End: 2022-03-25

## 2021-08-05 RX ORDER — DILTIAZEM HYDROCHLORIDE 240 MG/1
CAPSULE, EXTENDED RELEASE ORAL
Qty: 90 CAPSULE | Refills: 1 | Status: SHIPPED | OUTPATIENT
Start: 2021-08-05 | End: 2022-03-25

## 2021-08-05 RX ORDER — POTASSIUM CHLORIDE 750 MG/1
TABLET, EXTENDED RELEASE ORAL
Qty: 180 TABLET | Refills: 1 | Status: SHIPPED | OUTPATIENT
Start: 2021-08-05 | End: 2022-03-25

## 2021-08-05 NOTE — TELEPHONE ENCOUNTER
Last OV: 2/24/2021 eye problems  Last RX:   Next scheduled apt: Visit date not found        Sure scripts request    RX pending

## 2021-10-21 ENCOUNTER — TELEPHONE (OUTPATIENT)
Dept: FAMILY MEDICINE CLINIC | Age: 56
End: 2021-10-21

## 2021-10-21 DIAGNOSIS — Z12.31 VISIT FOR SCREENING MAMMOGRAM: Primary | ICD-10-CM

## 2021-10-21 NOTE — TELEPHONE ENCOUNTER
Stephanie Ocampo needs a mammogram order. She did not get one done last year due to René. Can we please order this for her. Health Maintenance   Topic Date Due    COVID-19 Vaccine (1) Never done    Flu vaccine (1) 09/01/2021    Cervical cancer screen  10/09/2021    Breast cancer screen  12/03/2021    Potassium monitoring  02/24/2022    Creatinine monitoring  02/24/2022    Diabetes screen  02/24/2024    Lipid screen  02/24/2026    DTaP/Tdap/Td vaccine (2 - Td or Tdap) 09/12/2026    Colon cancer screen colonoscopy  10/07/2029    Pneumococcal 0-64 years Vaccine (2 of 2 - PPSV23) 03/16/2030    Shingles Vaccine  Completed    Hepatitis C screen  Addressed    HIV screen  Addressed    Hepatitis A vaccine  Aged Out    Hepatitis B vaccine  Aged Out    Hib vaccine  Aged Out    Meningococcal (ACWY) vaccine  Aged Out             (applicable per patient's age: Cancer Screenings, Depression Screening, Fall Risk Screening, Immunizations)    Hemoglobin A1C (%)   Date Value   02/24/2021 5.4   12/10/2019 5.6   12/10/2018 5.1     LDL Cholesterol (mg/dL)   Date Value   02/24/2021 106     AST (U/L)   Date Value   02/24/2021 17     ALT (U/L)   Date Value   02/24/2021 18     BUN (mg/dL)   Date Value   02/24/2021 15      (goal A1C is < 7)   (goal LDL is <100) need 30-50% reduction from baseline     BP Readings from Last 3 Encounters:   02/24/21 116/68   02/03/21 132/88   10/07/19 119/67    (goal /80)      All Future Testing planned in [facility]:  Lab Frequency Next Occurrence       Next Visit Date:  No future appointments.          Patient Active Problem List:     HTN (hypertension)     Asthma     Dysthymia     History of colon polyps

## 2021-11-01 ENCOUNTER — HOSPITAL ENCOUNTER (OUTPATIENT)
Dept: MAMMOGRAPHY | Age: 56
Discharge: HOME | End: 2021-11-03
Payer: COMMERCIAL

## 2021-11-01 DIAGNOSIS — Z12.31 VISIT FOR SCREENING MAMMOGRAM: ICD-10-CM

## 2021-11-01 PROCEDURE — 77063 BREAST TOMOSYNTHESIS BI: CPT

## 2022-03-25 RX ORDER — HYDROCHLOROTHIAZIDE 25 MG/1
TABLET ORAL
Qty: 90 TABLET | Refills: 1 | Status: SHIPPED | OUTPATIENT
Start: 2022-03-25 | End: 2022-08-18

## 2022-03-25 RX ORDER — QUINAPRIL 40 MG/1
TABLET ORAL
Qty: 90 TABLET | Refills: 1 | Status: SHIPPED | OUTPATIENT
Start: 2022-03-25 | End: 2022-08-18

## 2022-03-25 RX ORDER — BUPROPION HYDROCHLORIDE 300 MG/1
TABLET ORAL
Qty: 90 TABLET | Refills: 1 | Status: SHIPPED | OUTPATIENT
Start: 2022-03-25 | End: 2022-08-18

## 2022-03-25 RX ORDER — DILTIAZEM HYDROCHLORIDE 240 MG/1
CAPSULE, EXTENDED RELEASE ORAL
Qty: 90 CAPSULE | Refills: 1 | Status: SHIPPED | OUTPATIENT
Start: 2022-03-25 | End: 2022-08-18

## 2022-03-25 RX ORDER — POTASSIUM CHLORIDE 750 MG/1
TABLET, EXTENDED RELEASE ORAL
Qty: 180 TABLET | Refills: 1 | Status: SHIPPED | OUTPATIENT
Start: 2022-03-25 | End: 2022-08-18

## 2022-03-31 ENCOUNTER — HOSPITAL ENCOUNTER (OUTPATIENT)
Age: 57
Discharge: HOME | End: 2022-03-31
Payer: COMMERCIAL

## 2022-03-31 ENCOUNTER — OFFICE VISIT (OUTPATIENT)
Dept: FAMILY MEDICINE CLINIC | Age: 57
End: 2022-03-31
Payer: COMMERCIAL

## 2022-03-31 VITALS
HEART RATE: 75 BPM | SYSTOLIC BLOOD PRESSURE: 122 MMHG | WEIGHT: 293 LBS | OXYGEN SATURATION: 98 % | DIASTOLIC BLOOD PRESSURE: 68 MMHG | BODY MASS INDEX: 45.99 KG/M2 | HEIGHT: 67 IN

## 2022-03-31 DIAGNOSIS — Z13.6 SCREENING FOR CARDIOVASCULAR CONDITION: ICD-10-CM

## 2022-03-31 DIAGNOSIS — F34.1 DYSTHYMIA: ICD-10-CM

## 2022-03-31 DIAGNOSIS — I10 ESSENTIAL HYPERTENSION: Primary | ICD-10-CM

## 2022-03-31 DIAGNOSIS — I10 ESSENTIAL HYPERTENSION: ICD-10-CM

## 2022-03-31 DIAGNOSIS — J34.89 NOSE IRRITATION: ICD-10-CM

## 2022-03-31 DIAGNOSIS — J45.40 MODERATE PERSISTENT ASTHMA WITHOUT COMPLICATION: ICD-10-CM

## 2022-03-31 DIAGNOSIS — R73.01 IFG (IMPAIRED FASTING GLUCOSE): ICD-10-CM

## 2022-03-31 LAB
ABSOLUTE EOS #: 0.3 K/UL (ref 0–0.4)
ABSOLUTE LYMPH #: 2.7 K/UL (ref 1–4.8)
ABSOLUTE MONO #: 0.7 K/UL (ref 0–1)
ALBUMIN SERPL-MCNC: 4.8 G/DL (ref 3.5–5.2)
ALP BLD-CCNC: 78 U/L (ref 35–104)
ALT SERPL-CCNC: 21 U/L (ref 5–33)
ANION GAP SERPL CALCULATED.3IONS-SCNC: 14 MMOL/L (ref 9–17)
AST SERPL-CCNC: 19 U/L
BASOPHILS # BLD: 1 % (ref 0–2)
BASOPHILS ABSOLUTE: 0.1 K/UL (ref 0–0.2)
BILIRUB SERPL-MCNC: 0.74 MG/DL (ref 0.3–1.2)
BUN BLDV-MCNC: 15 MG/DL (ref 6–20)
BUN/CREAT BLD: 17 (ref 9–20)
CALCIUM SERPL-MCNC: 9.4 MG/DL (ref 8.6–10.4)
CHLORIDE BLD-SCNC: 99 MMOL/L (ref 98–107)
CHOLESTEROL/HDL RATIO: 3
CHOLESTEROL: 208 MG/DL
CO2: 22 MMOL/L (ref 20–31)
CREAT SERPL-MCNC: 0.86 MG/DL (ref 0.5–0.9)
DIFFERENTIAL TYPE: YES
EOSINOPHILS RELATIVE PERCENT: 3 % (ref 0–5)
GFR AFRICAN AMERICAN: >60 ML/MIN
GFR NON-AFRICAN AMERICAN: >60 ML/MIN
GFR SERPL CREATININE-BSD FRML MDRD: ABNORMAL ML/MIN/{1.73_M2}
GLUCOSE BLD-MCNC: 113 MG/DL (ref 70–99)
HCT VFR BLD CALC: 45.8 % (ref 36–46)
HDLC SERPL-MCNC: 70 MG/DL
HEMOGLOBIN: 15.1 G/DL (ref 12–16)
LDL CHOLESTEROL: 112 MG/DL (ref 0–130)
LYMPHOCYTES # BLD: 31 % (ref 15–40)
MCH RBC QN AUTO: 29 PG (ref 26–34)
MCHC RBC AUTO-ENTMCNC: 32.8 G/DL (ref 31–37)
MCV RBC AUTO: 88.2 FL (ref 80–100)
MONOCYTES # BLD: 8 % (ref 4–8)
PATIENT FASTING?: NO
PDW BLD-RTO: 15.9 % (ref 12.1–15.2)
PLATELET # BLD: 278 K/UL (ref 140–450)
POTASSIUM SERPL-SCNC: 4 MMOL/L (ref 3.7–5.3)
RBC # BLD: 5.2 M/UL (ref 4–5.2)
SEG NEUTROPHILS: 57 % (ref 47–75)
SEGMENTED NEUTROPHILS ABSOLUTE COUNT: 5.1 K/UL (ref 2.5–7)
SODIUM BLD-SCNC: 135 MMOL/L (ref 135–144)
TOTAL PROTEIN: 7.7 G/DL (ref 6.4–8.3)
TRIGL SERPL-MCNC: 129 MG/DL
WBC # BLD: 8.9 K/UL (ref 3.5–11)

## 2022-03-31 PROCEDURE — 83036 HEMOGLOBIN GLYCOSYLATED A1C: CPT

## 2022-03-31 PROCEDURE — 85025 COMPLETE CBC W/AUTO DIFF WBC: CPT

## 2022-03-31 PROCEDURE — 36415 COLL VENOUS BLD VENIPUNCTURE: CPT

## 2022-03-31 PROCEDURE — 80061 LIPID PANEL: CPT

## 2022-03-31 PROCEDURE — 99214 OFFICE O/P EST MOD 30 MIN: CPT | Performed by: FAMILY MEDICINE

## 2022-03-31 PROCEDURE — 80053 COMPREHEN METABOLIC PANEL: CPT

## 2022-03-31 SDOH — ECONOMIC STABILITY: FOOD INSECURITY: WITHIN THE PAST 12 MONTHS, THE FOOD YOU BOUGHT JUST DIDN'T LAST AND YOU DIDN'T HAVE MONEY TO GET MORE.: NEVER TRUE

## 2022-03-31 SDOH — ECONOMIC STABILITY: FOOD INSECURITY: WITHIN THE PAST 12 MONTHS, YOU WORRIED THAT YOUR FOOD WOULD RUN OUT BEFORE YOU GOT MONEY TO BUY MORE.: NEVER TRUE

## 2022-03-31 ASSESSMENT — PATIENT HEALTH QUESTIONNAIRE - PHQ9
SUM OF ALL RESPONSES TO PHQ9 QUESTIONS 1 & 2: 1
SUM OF ALL RESPONSES TO PHQ QUESTIONS 1-9: 1
3. TROUBLE FALLING OR STAYING ASLEEP: 1
5. POOR APPETITE OR OVEREATING: 0
9. THOUGHTS THAT YOU WOULD BE BETTER OFF DEAD, OR OF HURTING YOURSELF: 0
1. LITTLE INTEREST OR PLEASURE IN DOING THINGS: 0
SUM OF ALL RESPONSES TO PHQ QUESTIONS 1-9: 1
SUM OF ALL RESPONSES TO PHQ QUESTIONS 1-9: 2
1. LITTLE INTEREST OR PLEASURE IN DOING THINGS: 0
SUM OF ALL RESPONSES TO PHQ QUESTIONS 1-9: 2
SUM OF ALL RESPONSES TO PHQ QUESTIONS 1-9: 2
7. TROUBLE CONCENTRATING ON THINGS, SUCH AS READING THE NEWSPAPER OR WATCHING TELEVISION: 0
8. MOVING OR SPEAKING SO SLOWLY THAT OTHER PEOPLE COULD HAVE NOTICED. OR THE OPPOSITE, BEING SO FIGETY OR RESTLESS THAT YOU HAVE BEEN MOVING AROUND A LOT MORE THAN USUAL: 0
2. FEELING DOWN, DEPRESSED OR HOPELESS: 1
4. FEELING TIRED OR HAVING LITTLE ENERGY: 0
SUM OF ALL RESPONSES TO PHQ QUESTIONS 1-9: 1
6. FEELING BAD ABOUT YOURSELF - OR THAT YOU ARE A FAILURE OR HAVE LET YOURSELF OR YOUR FAMILY DOWN: 0
SUM OF ALL RESPONSES TO PHQ9 QUESTIONS 1 & 2: 1
2. FEELING DOWN, DEPRESSED OR HOPELESS: 1
SUM OF ALL RESPONSES TO PHQ QUESTIONS 1-9: 2
SUM OF ALL RESPONSES TO PHQ QUESTIONS 1-9: 1

## 2022-03-31 ASSESSMENT — SOCIAL DETERMINANTS OF HEALTH (SDOH): HOW HARD IS IT FOR YOU TO PAY FOR THE VERY BASICS LIKE FOOD, HOUSING, MEDICAL CARE, AND HEATING?: NOT HARD AT ALL

## 2022-03-31 NOTE — PROGRESS NOTES
Name: Alysha Laureano  : 1965         Chief Complaint:     Chief Complaint   Patient presents with    Hypertension    Asthma       History of Present Illness:      Alysha Laureano is a 62 y.o.  female who presents with Hypertension and Asthma      HPI    Still has trouble with vision sometimes. Was seen at [facility] and reassured, vitreous pulling on retina and may detach at some point. Breathing ok if she avoids triggers, only typically using inhaler if she gets a cold that settles in chest. Stays on advair and singulair - dr Faina Ashley whom she'll see in May. HTN on meds, tolerating and feeling well    Depression in ok control. A little isolated past couple yrs r/t pandemic but is getting out and doing some things, seeing family, girls day with daughter recently. Lost DEA to covid after xmas party. Taking wellbutrin. Irritation and cracking L nostril which she r/t use of CPAP for HENRY. No tx tried. Medical History:     Patient Active Problem List   Diagnosis    HTN (hypertension)    Asthma    Dysthymia    History of colon polyps       Medications:       Prior to Admission medications    Medication Sig Start Date End Date Taking?  Authorizing Provider   hydroCHLOROthiazide (HYDRODIURIL) 25 MG tablet TAKE 1 TABLET DAILY 3/25/22  Yes Napoleon Aviles DO   quinapril (ACCUPRIL) 40 MG tablet TAKE 1 TABLET NIGHTLY 3/25/22  Yes Napoleon Aviles DO   dilTIAZem (DILT-XR) 240 MG extended release capsule TAKE 1 CAPSULE DAILY 3/25/22  Yes Napoleon Aviles DO   buPROPion (WELLBUTRIN XL) 300 MG extended release tablet TAKE 1 TABLET EVERY MORNING 3/25/22  Yes Napoleon Aviles DO   potassium chloride (KLOR-CON M10) 10 MEQ extended release tablet TAKE 1 TABLET TWICE A DAY 3/25/22  Yes Napoleon Aviles DO   Naproxen Sodium (ALEVE PO) Take by mouth Indications: three times daily   Yes Historical Provider, MD   PROVENTIL  (90 BASE) MCG/ACT inhaler Take 1 puff by mouth Prn 16  Yes Historical Provider, MD   fluticasone (FLONASE) 50 MCG/ACT nasal spray  4/3/15  Yes Historical Provider, MD   CALCIUM PO Take  by mouth. Yes Historical Provider, MD   MAGNESIUM PO Take  by mouth. Yes Historical Provider, MD   KRILL OIL PO Take  by mouth. Yes Historical Provider, MD   ADV DISKUS 250-50 MCG/DOSE AEPB Inhale 1 puff into the lungs 2 times daily. 7/31/12  Yes Historical Provider, MD   montelukast (SINGULAIR) 10 MG tablet Take 10 mg by mouth nightly. Yes Historical Provider, MD   Cetirizine HCl (ZYRTEC ALLERGY PO) Take  by mouth. One daily   Yes Historical Provider, MD   Omeprazole (PRILOSEC PO) Take 20 mg by mouth daily    Yes Historical Provider, MD   metoclopramide (REGLAN) 10 MG tablet Take 10 mg by mouth 4 times daily. Yes Historical Provider, MD   aspirin 81 MG EC tablet Take 81 mg by mouth daily. Takes 2 daily   Yes Historical Provider, MD   potassium chloride (K-DUR) 10 MEQ tablet TAKE 1 TABLET DAILY 5/16/13 9/5/13  Park Otoole DO   diltiazem (CARDIZEM CD) 240 MG ER capsule Take 1 capsule by mouth daily. 10/4/12 5/16/13  Shaquille Otoole DO        Allergies:       Patient has no known allergies. Physical Exam:     Vitals:  /68   Pulse 75   Ht 5' 7\" (1.702 m)   Wt (!) 377 lb (171 kg)   LMP 09/01/2015   SpO2 98%   BMI 59.05 kg/m²   Physical Exam  Vitals and nursing note reviewed. Constitutional:       General: She is not in acute distress. Appearance: She is well-developed. HENT:      Head: Normocephalic and atraumatic. Right Ear: Tympanic membrane and ear canal normal.      Left Ear: Tympanic membrane and ear canal normal.      Nose: Nose normal.      Comments: Cracking and dried blood present lateral base of L naris. No crusting. Eyes:      Conjunctiva/sclera: Conjunctivae normal.   Cardiovascular:      Rate and Rhythm: Normal rate and regular rhythm. Heart sounds: Normal heart sounds.    Pulmonary:      Effort: Pulmonary effort is normal.      Breath sounds: Normal breath sounds. Musculoskeletal:      Cervical back: Neck supple. Lymphadenopathy:      Cervical: No cervical adenopathy. Skin:     General: Skin is warm and dry. Neurological:      Mental Status: She is alert and oriented to person, place, and time. Psychiatric:         Judgment: Judgment normal.         Data:     Lab Results   Component Value Date     03/31/2022    K 4.0 03/31/2022    CL 99 03/31/2022    CO2 22 03/31/2022    BUN 15 03/31/2022    CREATININE 0.86 03/31/2022    GLUCOSE 113 03/31/2022    GLUCOSE 101 11/03/2011    PROT 7.7 03/31/2022    LABALBU 4.8 03/31/2022    LABALBU 4.5 11/03/2011    BILITOT 0.74 03/31/2022    ALKPHOS 78 03/31/2022    AST 19 03/31/2022    ALT 21 03/31/2022     Lab Results   Component Value Date    WBC 8.9 03/31/2022    RBC 5.20 03/31/2022    HGB 15.1 03/31/2022    HCT 45.8 03/31/2022    MCV 88.2 03/31/2022    MCH 29.0 03/31/2022    MCHC 32.8 03/31/2022    RDW 15.9 03/31/2022     03/31/2022    MPV NOT REPORTED 02/24/2021     Lab Results   Component Value Date    TSH 1.33 02/24/2021     Lab Results   Component Value Date    CHOL 201 02/24/2021    HDL 62 02/24/2021    LABA1C 5.4 02/24/2021         Assessment & Plan:        Diagnosis Orders   1. Essential hypertension  CBC with Auto Differential   2. Moderate persistent asthma without complication     3. Dysthymia     4. Nose irritation     5. IFG (impaired fasting glucose)  Hemoglobin A1C   6. Screening for cardiovascular condition  Comprehensive Metabolic Panel    Lipid Panel     Hypertension controlled, continue current Rx  Asthma controlled, continue Advair, Singulair, following with pulmonology. Dysthymia controlled, continue Wellbutrin current dosing  Nasal irritation and dryness, recommended saline gel  Updating labs. Had leukocytosis on labs last year so rechecking.     Requested Prescriptions      No prescriptions requested or ordered in this encounter         Patient Instructions   For dry nose - please buy a tube of saline gel (one brand is AYR) to put in there using a qtip.        signed by Phoebe Chu DO on 3/31/2022 at 5:11 PM  [address] [address]  Dept: [phone]

## 2022-04-01 LAB
ESTIMATED AVERAGE GLUCOSE: 117 MG/DL
HBA1C MFR BLD: 5.7 % (ref 4–6)

## 2022-08-18 RX ORDER — BUPROPION HYDROCHLORIDE 300 MG/1
TABLET ORAL
Qty: 90 TABLET | Refills: 1 | Status: SHIPPED | OUTPATIENT
Start: 2022-08-18

## 2022-08-18 RX ORDER — DILTIAZEM HYDROCHLORIDE 240 MG/1
CAPSULE, EXTENDED RELEASE ORAL
Qty: 90 CAPSULE | Refills: 1 | Status: SHIPPED | OUTPATIENT
Start: 2022-08-18

## 2022-08-18 RX ORDER — POTASSIUM CHLORIDE 750 MG/1
TABLET, EXTENDED RELEASE ORAL
Qty: 180 TABLET | Refills: 1 | Status: SHIPPED | OUTPATIENT
Start: 2022-08-18

## 2022-08-18 RX ORDER — QUINAPRIL 40 MG/1
TABLET ORAL
Qty: 90 TABLET | Refills: 1 | Status: SHIPPED | OUTPATIENT
Start: 2022-08-18

## 2022-08-18 RX ORDER — HYDROCHLOROTHIAZIDE 25 MG/1
TABLET ORAL
Qty: 90 TABLET | Refills: 1 | Status: SHIPPED | OUTPATIENT
Start: 2022-08-18

## 2022-11-04 ENCOUNTER — TELEPHONE (OUTPATIENT)
Dept: FAMILY MEDICINE CLINIC | Age: 57
End: 2022-11-04

## 2022-11-04 ENCOUNTER — HOSPITAL ENCOUNTER (OUTPATIENT)
Dept: MAMMOGRAPHY | Age: 57
Discharge: HOME | End: 2022-11-06
Payer: COMMERCIAL

## 2022-11-04 DIAGNOSIS — Z12.31 VISIT FOR SCREENING MAMMOGRAM: ICD-10-CM

## 2022-11-04 DIAGNOSIS — Z12.31 VISIT FOR SCREENING MAMMOGRAM: Primary | ICD-10-CM

## 2022-11-04 PROCEDURE — 77067 SCR MAMMO BI INCL CAD: CPT

## 2022-11-29 ENCOUNTER — OFFICE VISIT (OUTPATIENT)
Dept: OBGYN CLINIC | Age: 57
End: 2022-11-29
Payer: COMMERCIAL

## 2022-11-29 ENCOUNTER — HOSPITAL ENCOUNTER (OUTPATIENT)
Age: 57
Setting detail: SPECIMEN
Discharge: HOME | End: 2022-11-29
Payer: COMMERCIAL

## 2022-11-29 VITALS
HEART RATE: 73 BPM | OXYGEN SATURATION: 97 % | DIASTOLIC BLOOD PRESSURE: 82 MMHG | WEIGHT: 293 LBS | HEIGHT: 67 IN | RESPIRATION RATE: 18 BRPM | SYSTOLIC BLOOD PRESSURE: 128 MMHG | BODY MASS INDEX: 45.99 KG/M2

## 2022-11-29 DIAGNOSIS — Z01.419 WOMEN'S ANNUAL ROUTINE GYNECOLOGICAL EXAMINATION: Primary | ICD-10-CM

## 2022-11-29 DIAGNOSIS — Z01.419 WOMEN'S ANNUAL ROUTINE GYNECOLOGICAL EXAMINATION: ICD-10-CM

## 2022-11-29 PROCEDURE — 3078F DIAST BP <80 MM HG: CPT | Performed by: OBSTETRICS & GYNECOLOGY

## 2022-11-29 PROCEDURE — G0145 SCR C/V CYTO,THINLAYER,RESCR: HCPCS

## 2022-11-29 PROCEDURE — 99386 PREV VISIT NEW AGE 40-64: CPT | Performed by: OBSTETRICS & GYNECOLOGY

## 2022-11-29 PROCEDURE — 3074F SYST BP LT 130 MM HG: CPT | Performed by: OBSTETRICS & GYNECOLOGY

## 2022-11-29 NOTE — PATIENT INSTRUCTIONS
SURVEY:    You may be receiving a survey from [employer] regarding your visit today. Please complete the survey to enable us to provide the highest quality of care to you and your family. If you cannot score us a very good on any question, please call the office to discuss how we could have made your experience a very good one. Thank you.   MD Ladarius Morley MD Armida Romney, MD Leslye Pound, DO Ifeoma Williamson, PARKER Guerra, [address]  Halina Barthel, [facility], [place]

## 2022-11-29 NOTE — PROGRESS NOTES
PE:  Vital Signs  Blood pressure 128/82, pulse 73, resp. rate 18, height 5' 7\" (1.702 m), weight (!) 386 lb (175.1 kg), last menstrual period 09/01/2015, SpO2 97 %. Labs:    No results found for this visit on 11/29/22. HPI: The patient is here today for a yearly exam, she has no problems or complaints at this time    YesPT denies fever, chills, nausea and vomiting       Objective  Lymphatic:   no lymphadenopathy  Heent:   negative   Cor: regular rate and rhythm, no murmurs              Pul:clear to auscultation bilaterally- no wheezes, rales or rhonchi, normal air movement, no respiratory distress      GI: Abdomen soft, non-tender. BS normal. No masses,  No organomegaly, morbid obesity noted           Extremities: normal strength, tone, and muscle mass   Breasts: Breast:normal appearance, no masses or tenderness, Inspection negative, No nipple retraction or dimpling, No nipple discharge or bleeding, No axillary or supraclavicular adenopathy, Normal to palpation without dominant masses   Pelvic Exam: GENITAL/URINARY:  External Genitalia:  General appearance; normal, Hair distribution; normal, Lesions absent  Vagina:  General appearance normal, Discharge absent, Lesions absent, Pelvic support normal  Cervix:  General appearance normal, Lesions absent, Discharge absent, Tenderness absent, Enlargement absent, Nodularity absent  Uterus:  Size normal, Contour normal, Position normal, Masses absent, Consistency; normal, Support normal, Tenderness absent  Adenexa: Masses absent, Tenderness absent, Enlargement absent, Nodularity absent                                      Vaginal discharge: no vaginal discharge      Uterus: normal size, anteverted, mobile, non-tender, normal shape and consistency     Ovaries: Nonpalpable           Over 50% of time spent on counseling and care coordination on: see assessment and plan                        Assessment and Plan: Routine care        Diagnosis Orders   1.  Women's annual routine gynecological examination  PAP Smear          I am having Daniela Murphy. Diane maintain her Cetirizine HCl (ZYRTEC ALLERGY PO), Omeprazole (PRILOSEC PO), metoclopramide, aspirin, montelukast, Advair Diskus, CALCIUM PO, MAGNESIUM PO, KRILL OIL PO, fluticasone, Proventil HFA, Naproxen Sodium (ALEVE PO), potassium chloride, hydroCHLOROthiazide, quinapril, buPROPion, and dilTIAZem.

## 2023-01-14 NOTE — TELEPHONE ENCOUNTER
Last OV: 3/31/2022 chronic  Last RX:    Next scheduled apt: Visit date not found           Surescript requesting a refill PATIENT INSTRUCTIONS    Thank you for seeing me today, it was a pleasure taking care of you. Please check out at the  and schedule a follow up appointment. Return in about 1 year (around 1/14/2024) for physical.  Please get your labs drawn - you may need to schedule a lab appointment if this was not completed at your recent doctor's visit. The following imaging studies were ordered: None  Please also follow up with the following specialists: None  Please fill out the advance directive information (power of  documents) and bring a copy to our clinic. Famotidine (Pepcid) as needed for acid reflux  Imiquimod - 3.75% cream: Apply a thin layer once daily (using up to 1 packet or 1 full actuation of pump) prior to bedtime; leave on skin for ~8 hours, then remove with mild soap and water. Continue treatment until there is total clearance of the warts or for a maximum duration of therapy of 8 weeks.      Rafael,   Dr. Michael Mueller

## 2023-02-02 RX ORDER — POTASSIUM CHLORIDE 750 MG/1
TABLET, EXTENDED RELEASE ORAL
Qty: 180 TABLET | Refills: 1 | Status: SHIPPED | OUTPATIENT
Start: 2023-02-02

## 2023-02-02 RX ORDER — DILTIAZEM HYDROCHLORIDE 240 MG/1
CAPSULE, EXTENDED RELEASE ORAL
Qty: 90 CAPSULE | Refills: 1 | Status: SHIPPED | OUTPATIENT
Start: 2023-02-02

## 2023-02-02 RX ORDER — BUPROPION HYDROCHLORIDE 300 MG/1
TABLET ORAL
Qty: 90 TABLET | Refills: 1 | Status: SHIPPED | OUTPATIENT
Start: 2023-02-02

## 2023-02-02 RX ORDER — QUINAPRIL 40 MG/1
TABLET ORAL
Qty: 90 TABLET | Refills: 1 | Status: SHIPPED | OUTPATIENT
Start: 2023-02-02

## 2023-02-02 RX ORDER — HYDROCHLOROTHIAZIDE 25 MG/1
TABLET ORAL
Qty: 90 TABLET | Refills: 1 | Status: SHIPPED | OUTPATIENT
Start: 2023-02-02

## 2023-02-02 NOTE — TELEPHONE ENCOUNTER
Last visit:  3/31/2022  Next Visit Date:  No future appointments.      Medication List:  Prior to Admission medications    Medication Sig Start Date End Date Taking? Authorizing Provider   potassium chloride (KLOR-CON M10) 10 MEQ extended release tablet TAKE 1 TABLET TWICE A DAY 8/18/22   Paige Watkins, DO   hydroCHLOROthiazide (HYDRODIURIL) 25 MG tablet TAKE 1 TABLET DAILY 8/18/22   Paige Watkins, DO   quinapril (ACCUPRIL) 40 MG tablet TAKE 1 TABLET NIGHTLY 8/18/22   Paige Watkins, DO   buPROPion (WELLBUTRIN XL) 300 MG extended release tablet TAKE 1 TABLET EVERY MORNING 8/18/22   Paige Watkins, DO   dilTIAZem (DILT-XR) 240 MG extended release capsule TAKE 1 CAPSULE DAILY 8/18/22   Paige Watkins, DO   Naproxen Sodium (ALEVE PO) Take by mouth Indications: three times daily    Historical Provider, MD   PROVENTIL  (90 BASE) MCG/ACT inhaler Take 1 puff by mouth Prn 2/4/16   Historical Provider, MD   fluticasone (FLONASE) 50 MCG/ACT nasal spray  4/3/15   Historical Provider, MD   CALCIUM PO Take  by mouth.    Historical Provider, MD   MAGNESIUM PO Take  by mouth.    Historical Provider, MD   KRILL OIL PO Take  by mouth.    Historical Provider, MD   potassium chloride (K-DUR) 10 MEQ tablet TAKE 1 TABLET DAILY 5/16/13 9/5/13  Shaquille Otoole,    ADVAIR DISKUS 250-50 MCG/DOSE AEPB Inhale 1 puff into the lungs 2 times daily. 7/31/12   Historical Provider, MD   diltiazem (CARDIZEM CD) 240 MG ER capsule Take 1 capsule by mouth daily. 10/4/12 5/16/13  Shaquille Otoole DO   montelukast (SINGULAIR) 10 MG tablet Take 10 mg by mouth nightly.      Historical Provider, MD   Cetirizine HCl (ZYRTEC ALLERGY PO) Take  by mouth. One daily    Historical Provider, MD   Omeprazole (PRILOSEC PO) Take 20 mg by mouth daily     Historical Provider, MD   metoclopramide (REGLAN) 10 MG tablet Take 10 mg by mouth 4 times daily.      Historical Provider, MD   aspirin 81 MG EC tablet Take 81 mg by mouth daily. Takes 2 daily     Historical Provider, MD

## 2023-02-04 ENCOUNTER — PATIENT MESSAGE (OUTPATIENT)
Dept: FAMILY MEDICINE CLINIC | Age: 58
End: 2023-02-04

## 2023-02-06 RX ORDER — LISINOPRIL 40 MG/1
40 TABLET ORAL DAILY
Qty: 90 TABLET | Refills: 1 | Status: SHIPPED | OUTPATIENT
Start: 2023-02-06

## 2023-02-06 NOTE — TELEPHONE ENCOUNTER
From: Magdalena Thurman  To: Dr. Sam Zamudio: 2/4/2023 12:39 PM EST  Subject: Abdoulaye Krishnan'    [facility] contacted me and asked if I would get in touch with you regarding my prescription renewal from Feb. 1. Evidently they have a question regarding the Quinipril 40 mg. tab prescription, and need an answer before they fill it. They stated that they have reached out to you multiple times with no response. They wanted me to request that you respond to the inquiry. Thanks!  Kishore Martinez

## 2023-02-06 NOTE — TELEPHONE ENCOUNTER
Received noticed this morning from [employer]. Quinapril 10, 20 and 40 on national backorder, not available at [facility].   Also due for appt

## 2023-04-04 ENCOUNTER — HOSPITAL ENCOUNTER (OUTPATIENT)
Age: 58
Discharge: HOME | End: 2023-04-04
Payer: COMMERCIAL

## 2023-04-04 DIAGNOSIS — Z13.6 SCREENING FOR CARDIOVASCULAR CONDITION: ICD-10-CM

## 2023-04-04 DIAGNOSIS — R73.01 IFG (IMPAIRED FASTING GLUCOSE): ICD-10-CM

## 2023-04-04 DIAGNOSIS — Z78.0 POST-MENOPAUSAL: ICD-10-CM

## 2023-04-04 LAB
ABSOLUTE EOS #: 0.2 K/UL (ref 0–0.4)
ABSOLUTE LYMPH #: 2.1 K/UL (ref 1–4.8)
ABSOLUTE MONO #: 0.6 K/UL (ref 0–1)
ALBUMIN SERPL-MCNC: 4.7 G/DL (ref 3.5–5.2)
ALP SERPL-CCNC: 75 U/L (ref 35–104)
ALT SERPL-CCNC: 18 U/L (ref 5–33)
ANION GAP SERPL CALCULATED.3IONS-SCNC: 11 MMOL/L (ref 9–17)
AST SERPL-CCNC: 18 U/L
BASOPHILS # BLD: 1 % (ref 0–2)
BASOPHILS ABSOLUTE: 0 K/UL (ref 0–0.2)
BILIRUB SERPL-MCNC: 0.6 MG/DL (ref 0.3–1.2)
BUN SERPL-MCNC: 19 MG/DL (ref 6–20)
BUN/CREAT BLD: 20 (ref 9–20)
CALCIUM SERPL-MCNC: 9.6 MG/DL (ref 8.6–10.4)
CHLORIDE SERPL-SCNC: 100 MMOL/L (ref 98–107)
CO2 SERPL-SCNC: 25 MMOL/L (ref 20–31)
CREAT SERPL-MCNC: 0.94 MG/DL (ref 0.5–0.9)
DIFFERENTIAL TYPE: YES
EOSINOPHILS RELATIVE PERCENT: 2 % (ref 0–5)
GFR SERPL CREATININE-BSD FRML MDRD: >60 ML/MIN/1.73M2
GLUCOSE SERPL-MCNC: 120 MG/DL (ref 70–99)
HCT VFR BLD AUTO: 45.9 % (ref 36–46)
HGB BLD-MCNC: 15.3 G/DL (ref 12–16)
LYMPHOCYTES # BLD: 25 % (ref 15–40)
MCH RBC QN AUTO: 30.3 PG (ref 26–34)
MCHC RBC AUTO-ENTMCNC: 33.4 G/DL (ref 31–37)
MCV RBC AUTO: 90.6 FL (ref 80–100)
MONOCYTES # BLD: 7 % (ref 4–8)
PDW BLD-RTO: 14.7 % (ref 12.1–15.2)
PLATELET # BLD AUTO: 267 K/UL (ref 140–450)
POTASSIUM SERPL-SCNC: 4.2 MMOL/L (ref 3.7–5.3)
PROT SERPL-MCNC: 7.4 G/DL (ref 6.4–8.3)
RBC # BLD: 5.07 M/UL (ref 4–5.2)
SEG NEUTROPHILS: 65 % (ref 47–75)
SEGMENTED NEUTROPHILS ABSOLUTE COUNT: 5.6 K/UL (ref 2.5–7)
SODIUM SERPL-SCNC: 136 MMOL/L (ref 135–144)
WBC # BLD AUTO: 8.5 K/UL (ref 3.5–11)

## 2023-04-04 PROCEDURE — 83036 HEMOGLOBIN GLYCOSYLATED A1C: CPT

## 2023-04-04 PROCEDURE — 36415 COLL VENOUS BLD VENIPUNCTURE: CPT

## 2023-04-04 PROCEDURE — 82306 VITAMIN D 25 HYDROXY: CPT

## 2023-04-04 PROCEDURE — 85025 COMPLETE CBC W/AUTO DIFF WBC: CPT

## 2023-04-04 PROCEDURE — 80061 LIPID PANEL: CPT

## 2023-04-04 PROCEDURE — 80053 COMPREHEN METABOLIC PANEL: CPT

## 2023-04-05 LAB
25(OH)D3 SERPL-MCNC: 37.6 NG/ML
CHOLEST SERPL-MCNC: 226 MG/DL
CHOLESTEROL/HDL RATIO: 3.5
EST. AVERAGE GLUCOSE BLD GHB EST-MCNC: 114 MG/DL
HBA1C MFR BLD: 5.6 % (ref 4–6)
HDLC SERPL-MCNC: 64 MG/DL
LDLC SERPL CALC-MCNC: 140 MG/DL (ref 0–130)
TRIGL SERPL-MCNC: 111 MG/DL

## 2023-07-17 ENCOUNTER — PATIENT MESSAGE (OUTPATIENT)
Dept: FAMILY MEDICINE CLINIC | Age: 58
End: 2023-07-17

## 2023-07-17 RX ORDER — POTASSIUM CHLORIDE 750 MG/1
TABLET, EXTENDED RELEASE ORAL
Qty: 180 TABLET | Refills: 1 | Status: SHIPPED | OUTPATIENT
Start: 2023-07-17

## 2023-07-17 RX ORDER — LISINOPRIL 40 MG/1
40 TABLET ORAL DAILY
Qty: 90 TABLET | Refills: 1 | Status: SHIPPED | OUTPATIENT
Start: 2023-07-17

## 2023-07-17 RX ORDER — DILTIAZEM HYDROCHLORIDE 240 MG/1
CAPSULE, EXTENDED RELEASE ORAL
Qty: 90 CAPSULE | Refills: 1 | Status: SHIPPED | OUTPATIENT
Start: 2023-07-17

## 2023-07-17 RX ORDER — BUPROPION HYDROCHLORIDE 300 MG/1
300 TABLET ORAL EVERY MORNING
Qty: 90 TABLET | Refills: 1 | Status: SHIPPED | OUTPATIENT
Start: 2023-07-17

## 2023-07-17 RX ORDER — HYDROCHLOROTHIAZIDE 25 MG/1
25 TABLET ORAL DAILY
Qty: 90 TABLET | Refills: 1 | Status: SHIPPED | OUTPATIENT
Start: 2023-07-17

## 2023-07-17 NOTE — TELEPHONE ENCOUNTER
Last visit:  4/4/2023  Next Visit Date:  No future appointments. Medication List:  Prior to Admission medications    Medication Sig Start Date End Date Taking? Authorizing Provider   lisinopril (PRINIVIL;ZESTRIL) 40 MG tablet Take 1 tablet by mouth daily 2/6/23   Jenny Rodriguez, DO   dilTIAZem (DILT-XR) 240 MG extended release capsule TAKE 1 CAPSULE DAILY 2/2/23   Jenny Rodriguez, DO   hydroCHLOROthiazide (HYDRODIURIL) 25 MG tablet TAKE 1 TABLET DAILY 2/2/23   Jenny Rodriguez, DO   potassium chloride (KLOR-CON M10) 10 MEQ extended release tablet TAKE 1 TABLET TWICE A DAY 2/2/23   Jenny Rodriguez, DO   buPROPion (WELLBUTRIN XL) 300 MG extended release tablet TAKE 1 TABLET EVERY MORNING 2/2/23   Jenny Rodriguez, DO   Naproxen Sodium (ALEVE PO) Take by mouth Indications: three times daily    Historical Provider, MD   PROVENTIL  (90 BASE) MCG/ACT inhaler Take 1 puff by mouth Prn 2/4/16   Historical Provider, MD   fluticasone Brie Dyers) 50 MCG/ACT nasal spray  4/3/15   Historical Provider, MD   CALCIUM PO Take  by mouth. Historical Provider, MD   MAGNESIUM PO Take  by mouth. Historical Provider, MD   KRILL OIL PO Take  by mouth. Historical Provider, MD   potassium chloride (K-DUR) 10 MEQ tablet TAKE 1 TABLET DAILY 5/16/13 9/5/13  Shaquille Otoole, DO   ADVAIR DISKUS 250-50 MCG/DOSE AEPB Inhale 1 puff into the lungs 2 times daily. 7/31/12   Historical Provider, MD   diltiazem (CARDIZEM CD) 240 MG ER capsule Take 1 capsule by mouth daily. 10/4/12 5/16/13  Shaquille Otoole, DO   montelukast (SINGULAIR) 10 MG tablet Take 10 mg by mouth nightly. Historical Provider, MD   Cetirizine HCl (ZYRTEC ALLERGY PO) Take  by mouth. One daily    Historical Provider, MD   Omeprazole (PRILOSEC PO) Take 20 mg by mouth daily     Historical Provider, MD   metoclopramide (REGLAN) 10 MG tablet Take 10 mg by mouth 4 times daily. Historical Provider, MD   aspirin 81 MG EC tablet Take 81 mg by mouth daily.  Takes 2

## 2023-07-17 NOTE — TELEPHONE ENCOUNTER
From: Donta Hopper  To: Dr. Speedy Field: 7/17/2023 12:27 PM EDT  Subject: Haskel Landau Dr. Arloa Muskrat has asked to contact you about my recent prescription renewal. Supposedly they can't reach you about missing information on the Lisinopril, Bupropion and Diltiazem. I was hoping you could clear up whatever issues they have.     Thanks so much, Sky

## 2024-01-31 ENCOUNTER — PATIENT MESSAGE (OUTPATIENT)
Dept: FAMILY MEDICINE CLINIC | Age: 59
End: 2024-01-31

## 2024-01-31 RX ORDER — LISINOPRIL 40 MG/1
40 TABLET ORAL DAILY
Qty: 90 TABLET | Refills: 0 | Status: SHIPPED | OUTPATIENT
Start: 2024-01-31

## 2024-01-31 RX ORDER — BUPROPION HYDROCHLORIDE 300 MG/1
300 TABLET ORAL EVERY MORNING
Qty: 90 TABLET | Refills: 0 | Status: SHIPPED | OUTPATIENT
Start: 2024-01-31

## 2024-01-31 RX ORDER — DILTIAZEM HYDROCHLORIDE 240 MG/1
CAPSULE, EXTENDED RELEASE ORAL
Qty: 90 CAPSULE | Refills: 0 | Status: SHIPPED | OUTPATIENT
Start: 2024-01-31

## 2024-01-31 RX ORDER — HYDROCHLOROTHIAZIDE 25 MG/1
25 TABLET ORAL DAILY
Qty: 90 TABLET | Refills: 0 | Status: SHIPPED | OUTPATIENT
Start: 2024-01-31

## 2024-01-31 RX ORDER — POTASSIUM CHLORIDE 750 MG/1
TABLET, EXTENDED RELEASE ORAL
Qty: 180 TABLET | Refills: 0 | Status: SHIPPED | OUTPATIENT
Start: 2024-01-31

## 2024-01-31 NOTE — TELEPHONE ENCOUNTER
Last visit:  4/4/2023  Next Visit Date:  No future appointments.      Medication List:  Prior to Admission medications    Medication Sig Start Date End Date Taking? Authorizing Provider   lisinopril (PRINIVIL;ZESTRIL) 40 MG tablet Take 1 tablet by mouth daily 7/17/23   Paige Watkins DO   potassium chloride (KLOR-CON M10) 10 MEQ extended release tablet TAKE 1 TABLET TWICE A DAY 7/17/23   Paige Watkins DO   dilTIAZem (DILT-XR) 240 MG extended release capsule TAKE 1 CAPSULE DAILY 7/17/23   Paige Watkins DO   hydroCHLOROthiazide (HYDRODIURIL) 25 MG tablet Take 1 tablet by mouth daily 7/17/23   Paige Watkins DO   buPROPion (WELLBUTRIN XL) 300 MG extended release tablet Take 1 tablet by mouth every morning 7/17/23   Paige Watkins DO   Naproxen Sodium (ALEVE PO) Take by mouth Indications: three times daily    Edvin Alvarado MD   PROVENTIL  (90 BASE) MCG/ACT inhaler Take 1 puff by mouth Prn 2/4/16   Edvin Alvarado MD   fluticasone (FLONASE) 50 MCG/ACT nasal spray  4/3/15   Edvin Alvarado MD   CALCIUM PO Take  by mouth.    Edvin Alvarado MD   MAGNESIUM PO Take  by mouth.    Edvin Alvarado MD   KRILL OIL PO Take  by mouth.    Edvin Alvarado MD   potassium chloride (K-DUR) 10 MEQ tablet TAKE 1 TABLET DAILY 5/16/13 9/5/13  Shaquille Otoole DO   ADVAIR DISKUS 250-50 MCG/DOSE AEPB Inhale 1 puff into the lungs 2 times daily. 7/31/12   Edvin Alvarado MD   diltiazem (CARDIZEM CD) 240 MG ER capsule Take 1 capsule by mouth daily. 10/4/12 5/16/13  Shaquille Otoole DO   montelukast (SINGULAIR) 10 MG tablet Take 10 mg by mouth nightly.      Edvin Alvarado MD   Cetirizine HCl (ZYRTEC ALLERGY PO) Take  by mouth. One daily    Edvin Alvarado MD   Omeprazole (PRILOSEC PO) Take 20 mg by mouth daily     Edvin Alvarado MD   metoclopramide (REGLAN) 10 MG tablet Take 10 mg by mouth 4 times daily.      Provider, MD Edvin   aspirin 81 MG EC

## 2024-04-16 RX ORDER — BUPROPION HYDROCHLORIDE 300 MG/1
300 TABLET ORAL EVERY MORNING
Qty: 90 TABLET | Refills: 3 | Status: SHIPPED | OUTPATIENT
Start: 2024-04-16

## 2024-04-16 RX ORDER — DILTIAZEM HYDROCHLORIDE 240 MG/1
CAPSULE, EXTENDED RELEASE ORAL
Qty: 90 CAPSULE | Refills: 3 | Status: SHIPPED | OUTPATIENT
Start: 2024-04-16

## 2024-04-16 RX ORDER — POTASSIUM CHLORIDE 750 MG/1
TABLET, EXTENDED RELEASE ORAL
Qty: 180 TABLET | Refills: 3 | Status: SHIPPED | OUTPATIENT
Start: 2024-04-16

## 2024-04-16 RX ORDER — HYDROCHLOROTHIAZIDE 25 MG/1
25 TABLET ORAL DAILY
Qty: 90 TABLET | Refills: 3 | Status: SHIPPED | OUTPATIENT
Start: 2024-04-16

## 2024-04-16 RX ORDER — LISINOPRIL 40 MG/1
40 TABLET ORAL DAILY
Qty: 90 TABLET | Refills: 3 | Status: SHIPPED | OUTPATIENT
Start: 2024-04-16

## 2024-04-16 NOTE — TELEPHONE ENCOUNTER
Last OV 4/4/23 mychart physical     Next OV not scheduled    Requesting refills on bupropion, diltiazem, hydrochlorothiazide, lisinopril, and potassium thru [facility].  Rx's pending

## 2024-04-27 SDOH — ECONOMIC STABILITY: HOUSING INSECURITY
IN THE LAST 12 MONTHS, WAS THERE A TIME WHEN YOU DID NOT HAVE A STEADY PLACE TO SLEEP OR SLEPT IN A SHELTER (INCLUDING NOW)?: NO

## 2024-04-27 SDOH — ECONOMIC STABILITY: FOOD INSECURITY: WITHIN THE PAST 12 MONTHS, YOU WORRIED THAT YOUR FOOD WOULD RUN OUT BEFORE YOU GOT MONEY TO BUY MORE.: NEVER TRUE

## 2024-04-27 SDOH — ECONOMIC STABILITY: INCOME INSECURITY: HOW HARD IS IT FOR YOU TO PAY FOR THE VERY BASICS LIKE FOOD, HOUSING, MEDICAL CARE, AND HEATING?: NOT HARD AT ALL

## 2024-04-27 SDOH — ECONOMIC STABILITY: FOOD INSECURITY: WITHIN THE PAST 12 MONTHS, THE FOOD YOU BOUGHT JUST DIDN'T LAST AND YOU DIDN'T HAVE MONEY TO GET MORE.: NEVER TRUE

## 2024-04-27 ASSESSMENT — PATIENT HEALTH QUESTIONNAIRE - PHQ9
3. TROUBLE FALLING OR STAYING ASLEEP: SEVERAL DAYS
2. FEELING DOWN, DEPRESSED OR HOPELESS: NOT AT ALL
1. LITTLE INTEREST OR PLEASURE IN DOING THINGS: NOT AT ALL
SUM OF ALL RESPONSES TO PHQ QUESTIONS 1-9: 3
1. LITTLE INTEREST OR PLEASURE IN DOING THINGS: NOT AT ALL
8. MOVING OR SPEAKING SO SLOWLY THAT OTHER PEOPLE COULD HAVE NOTICED. OR THE OPPOSITE, BEING SO FIGETY OR RESTLESS THAT YOU HAVE BEEN MOVING AROUND A LOT MORE THAN USUAL: NOT AT ALL
9. THOUGHTS THAT YOU WOULD BE BETTER OFF DEAD, OR OF HURTING YOURSELF: NOT AT ALL
10. IF YOU CHECKED OFF ANY PROBLEMS, HOW DIFFICULT HAVE THESE PROBLEMS MADE IT FOR YOU TO DO YOUR WORK, TAKE CARE OF THINGS AT HOME, OR GET ALONG WITH OTHER PEOPLE: NOT DIFFICULT AT ALL
10. IF YOU CHECKED OFF ANY PROBLEMS, HOW DIFFICULT HAVE THESE PROBLEMS MADE IT FOR YOU TO DO YOUR WORK, TAKE CARE OF THINGS AT HOME, OR GET ALONG WITH OTHER PEOPLE: NOT DIFFICULT AT ALL
SUM OF ALL RESPONSES TO PHQ9 QUESTIONS 1 & 2: 0
4. FEELING TIRED OR HAVING LITTLE ENERGY: SEVERAL DAYS
7. TROUBLE CONCENTRATING ON THINGS, SUCH AS READING THE NEWSPAPER OR WATCHING TELEVISION: NOT AT ALL
4. FEELING TIRED OR HAVING LITTLE ENERGY: SEVERAL DAYS
6. FEELING BAD ABOUT YOURSELF - OR THAT YOU ARE A FAILURE OR HAVE LET YOURSELF OR YOUR FAMILY DOWN: NOT AT ALL
2. FEELING DOWN, DEPRESSED OR HOPELESS: NOT AT ALL
8. MOVING OR SPEAKING SO SLOWLY THAT OTHER PEOPLE COULD HAVE NOTICED. OR THE OPPOSITE - BEING SO FIDGETY OR RESTLESS THAT YOU HAVE BEEN MOVING AROUND A LOT MORE THAN USUAL: NOT AT ALL
6. FEELING BAD ABOUT YOURSELF - OR THAT YOU ARE A FAILURE OR HAVE LET YOURSELF OR YOUR FAMILY DOWN: NOT AT ALL
3. TROUBLE FALLING OR STAYING ASLEEP: SEVERAL DAYS
9. THOUGHTS THAT YOU WOULD BE BETTER OFF DEAD, OR OF HURTING YOURSELF: NOT AT ALL
7. TROUBLE CONCENTRATING ON THINGS, SUCH AS READING THE NEWSPAPER OR WATCHING TELEVISION: NOT AT ALL
SUM OF ALL RESPONSES TO PHQ QUESTIONS 1-9: 3
SUM OF ALL RESPONSES TO PHQ QUESTIONS 1-9: 3
5. POOR APPETITE OR OVEREATING: SEVERAL DAYS
5. POOR APPETITE OR OVEREATING: SEVERAL DAYS

## 2024-04-30 ENCOUNTER — OFFICE VISIT (OUTPATIENT)
Dept: FAMILY MEDICINE CLINIC | Age: 59
End: 2024-04-30
Payer: COMMERCIAL

## 2024-04-30 VITALS
HEART RATE: 88 BPM | DIASTOLIC BLOOD PRESSURE: 70 MMHG | BODY MASS INDEX: 45.99 KG/M2 | SYSTOLIC BLOOD PRESSURE: 124 MMHG | WEIGHT: 293 LBS | HEIGHT: 67 IN | OXYGEN SATURATION: 97 %

## 2024-04-30 DIAGNOSIS — Z13.6 SCREENING FOR CARDIOVASCULAR CONDITION: ICD-10-CM

## 2024-04-30 DIAGNOSIS — I10 ESSENTIAL HYPERTENSION: Primary | ICD-10-CM

## 2024-04-30 DIAGNOSIS — L72.3 SEBACEOUS CYST: ICD-10-CM

## 2024-04-30 DIAGNOSIS — J45.40 MODERATE PERSISTENT ASTHMA WITHOUT COMPLICATION: ICD-10-CM

## 2024-04-30 DIAGNOSIS — Z12.31 VISIT FOR SCREENING MAMMOGRAM: ICD-10-CM

## 2024-04-30 DIAGNOSIS — R73.01 IFG (IMPAIRED FASTING GLUCOSE): ICD-10-CM

## 2024-04-30 DIAGNOSIS — M54.16 CHRONIC LUMBAR RADICULOPATHY: ICD-10-CM

## 2024-04-30 PROCEDURE — G8417 CALC BMI ABV UP PARAM F/U: HCPCS | Performed by: FAMILY MEDICINE

## 2024-04-30 PROCEDURE — 3078F DIAST BP <80 MM HG: CPT | Performed by: FAMILY MEDICINE

## 2024-04-30 PROCEDURE — 3074F SYST BP LT 130 MM HG: CPT | Performed by: FAMILY MEDICINE

## 2024-04-30 PROCEDURE — 99214 OFFICE O/P EST MOD 30 MIN: CPT | Performed by: FAMILY MEDICINE

## 2024-04-30 PROCEDURE — 3017F COLORECTAL CA SCREEN DOC REV: CPT | Performed by: FAMILY MEDICINE

## 2024-04-30 PROCEDURE — 1036F TOBACCO NON-USER: CPT | Performed by: FAMILY MEDICINE

## 2024-04-30 PROCEDURE — G8427 DOCREV CUR MEDS BY ELIG CLIN: HCPCS | Performed by: FAMILY MEDICINE

## 2024-04-30 NOTE — PROGRESS NOTES
Name: Amalia Contreras  : 1965         Chief Complaint:     Chief Complaint   Patient presents with    Hypertension    Depression    Asthma       History of Present Illness:      Amalia Contreras is a 59 y.o.  female who presents with Hypertension, Depression, and Asthma      HPI    Pain in low back, hips and knees, cut back on aleve d/t renal fn last yr and subsequently hasn't been sleeping as well d/t pain. Uses Hempvana cream usually on one hip or the other which helps a lot. Maybe 1 aleve tab per week. Takes motrin occasionally but it tends to keep her up at night a little. Does some exercises but some of those make the sciatic pain worse, mentions glute bridge, piriformis stretch, clamshells. Glute bridges seem like they might make it worse. Uses seated elliptical type of machine sometimes.     Asthma stable, saw pulm last week. Some seasonal allergies. On usual meds incl reglan which helped with cough from acid yrs ago, uses just twice a day.     Upper back midline cyst type structure present for a long time, then in January became very enlarged and sore. Dtr went to squeeze it but it essentially drained itself when she barely touched it. Couple wks later dtr drained it again. Hasn't bothered pt since then but she does believe it's still there.    Medical History:     Patient Active Problem List   Diagnosis    HTN (hypertension)    Asthma    Dysthymia    History of colon polyps       Medications:       Prior to Admission medications    Medication Sig Start Date End Date Taking? Authorizing Provider   dilTIAZem (DILACOR XR) 240 MG extended release capsule TAKE 1 CAPSULE DAILY 24  Yes Paige Watkins DO   potassium chloride (KLOR-CON M) 10 MEQ extended release tablet TAKE 1 TABLET TWICE A DAY 24  Yes Paige Watkins DO   lisinopril (PRINIVIL;ZESTRIL) 40 MG tablet TAKE 1 TABLET DAILY 24  Yes Paige Watkins DO   buPROPion (WELLBUTRIN XL) 300 MG extended release tablet TAKE 1

## 2024-04-30 NOTE — PATIENT INSTRUCTIONS
Please limit use of nsaid's, which includes ibuprofen (AKA motrin or advil) and naproxen (AKA aleve or naprosyn). You may safely use acetaminophen AKA tylenol.

## 2024-05-02 ENCOUNTER — HOSPITAL ENCOUNTER (OUTPATIENT)
Dept: MAMMOGRAPHY | Age: 59
Discharge: HOME | End: 2024-05-04
Attending: FAMILY MEDICINE
Payer: COMMERCIAL

## 2024-05-02 ENCOUNTER — HOSPITAL ENCOUNTER (OUTPATIENT)
Age: 59
Discharge: HOME | End: 2024-05-02
Attending: FAMILY MEDICINE
Payer: COMMERCIAL

## 2024-05-02 DIAGNOSIS — Z13.6 SCREENING FOR CARDIOVASCULAR CONDITION: ICD-10-CM

## 2024-05-02 DIAGNOSIS — Z12.31 VISIT FOR SCREENING MAMMOGRAM: ICD-10-CM

## 2024-05-02 DIAGNOSIS — R73.01 IFG (IMPAIRED FASTING GLUCOSE): ICD-10-CM

## 2024-05-02 LAB
ALBUMIN SERPL-MCNC: 4.6 G/DL (ref 3.5–5.2)
ALP SERPL-CCNC: 87 U/L (ref 35–104)
ALT SERPL-CCNC: 16 U/L (ref 5–33)
ANION GAP SERPL CALCULATED.3IONS-SCNC: 16 MMOL/L (ref 9–17)
AST SERPL-CCNC: 15 U/L
BILIRUB SERPL-MCNC: 0.7 MG/DL (ref 0.3–1.2)
BUN SERPL-MCNC: 12 MG/DL (ref 6–20)
BUN/CREAT SERPL: 13 (ref 9–20)
CALCIUM SERPL-MCNC: 9.8 MG/DL (ref 8.6–10.4)
CHLORIDE SERPL-SCNC: 99 MMOL/L (ref 98–107)
CHOLEST SERPL-MCNC: 219 MG/DL (ref 0–199)
CHOLESTEROL/HDL RATIO: 3
CO2 SERPL-SCNC: 22 MMOL/L (ref 20–31)
CREAT SERPL-MCNC: 0.9 MG/DL (ref 0.5–0.9)
EST. AVERAGE GLUCOSE BLD GHB EST-MCNC: 111 MG/DL
GFR, ESTIMATED: 74 ML/MIN/1.73M2
GLUCOSE SERPL-MCNC: 121 MG/DL (ref 70–99)
HBA1C MFR BLD: 5.5 % (ref 4–6)
HDLC SERPL-MCNC: 67 MG/DL
LDLC SERPL CALC-MCNC: 128 MG/DL (ref 0–100)
POTASSIUM SERPL-SCNC: 4.2 MMOL/L (ref 3.7–5.3)
PROT SERPL-MCNC: 8 G/DL (ref 6.4–8.3)
SODIUM SERPL-SCNC: 137 MMOL/L (ref 135–144)
TRIGL SERPL-MCNC: 123 MG/DL
VLDLC SERPL CALC-MCNC: 25 MG/DL

## 2024-05-02 PROCEDURE — 83036 HEMOGLOBIN GLYCOSYLATED A1C: CPT

## 2024-05-02 PROCEDURE — 77063 BREAST TOMOSYNTHESIS BI: CPT

## 2024-05-02 PROCEDURE — 36415 COLL VENOUS BLD VENIPUNCTURE: CPT

## 2024-05-02 PROCEDURE — 80061 LIPID PANEL: CPT

## 2024-05-02 PROCEDURE — 80053 COMPREHEN METABOLIC PANEL: CPT

## 2025-03-15 SDOH — ECONOMIC STABILITY: FOOD INSECURITY: WITHIN THE PAST 12 MONTHS, YOU WORRIED THAT YOUR FOOD WOULD RUN OUT BEFORE YOU GOT MONEY TO BUY MORE.: NEVER TRUE

## 2025-03-15 SDOH — ECONOMIC STABILITY: FOOD INSECURITY: WITHIN THE PAST 12 MONTHS, THE FOOD YOU BOUGHT JUST DIDN'T LAST AND YOU DIDN'T HAVE MONEY TO GET MORE.: NEVER TRUE

## 2025-03-15 SDOH — ECONOMIC STABILITY: TRANSPORTATION INSECURITY
IN THE PAST 12 MONTHS, HAS THE LACK OF TRANSPORTATION KEPT YOU FROM MEDICAL APPOINTMENTS OR FROM GETTING MEDICATIONS?: NO

## 2025-03-15 SDOH — ECONOMIC STABILITY: INCOME INSECURITY: IN THE LAST 12 MONTHS, WAS THERE A TIME WHEN YOU WERE NOT ABLE TO PAY THE MORTGAGE OR RENT ON TIME?: NO

## 2025-03-15 ASSESSMENT — PATIENT HEALTH QUESTIONNAIRE - PHQ9
9. THOUGHTS THAT YOU WOULD BE BETTER OFF DEAD, OR OF HURTING YOURSELF: NOT AT ALL
9. THOUGHTS THAT YOU WOULD BE BETTER OFF DEAD, OR OF HURTING YOURSELF: NOT AT ALL
2. FEELING DOWN, DEPRESSED OR HOPELESS: MORE THAN HALF THE DAYS
3. TROUBLE FALLING OR STAYING ASLEEP: MORE THAN HALF THE DAYS
4. FEELING TIRED OR HAVING LITTLE ENERGY: NEARLY EVERY DAY
8. MOVING OR SPEAKING SO SLOWLY THAT OTHER PEOPLE COULD HAVE NOTICED. OR THE OPPOSITE - BEING SO FIDGETY OR RESTLESS THAT YOU HAVE BEEN MOVING AROUND A LOT MORE THAN USUAL: NOT AT ALL
7. TROUBLE CONCENTRATING ON THINGS, SUCH AS READING THE NEWSPAPER OR WATCHING TELEVISION: MORE THAN HALF THE DAYS
10. IF YOU CHECKED OFF ANY PROBLEMS, HOW DIFFICULT HAVE THESE PROBLEMS MADE IT FOR YOU TO DO YOUR WORK, TAKE CARE OF THINGS AT HOME, OR GET ALONG WITH OTHER PEOPLE: SOMEWHAT DIFFICULT
6. FEELING BAD ABOUT YOURSELF - OR THAT YOU ARE A FAILURE OR HAVE LET YOURSELF OR YOUR FAMILY DOWN: MORE THAN HALF THE DAYS
3. TROUBLE FALLING OR STAYING ASLEEP: MORE THAN HALF THE DAYS
10. IF YOU CHECKED OFF ANY PROBLEMS, HOW DIFFICULT HAVE THESE PROBLEMS MADE IT FOR YOU TO DO YOUR WORK, TAKE CARE OF THINGS AT HOME, OR GET ALONG WITH OTHER PEOPLE: SOMEWHAT DIFFICULT
SUM OF ALL RESPONSES TO PHQ QUESTIONS 1-9: 13
7. TROUBLE CONCENTRATING ON THINGS, SUCH AS READING THE NEWSPAPER OR WATCHING TELEVISION: MORE THAN HALF THE DAYS
2. FEELING DOWN, DEPRESSED OR HOPELESS: MORE THAN HALF THE DAYS
5. POOR APPETITE OR OVEREATING: SEVERAL DAYS
6. FEELING BAD ABOUT YOURSELF - OR THAT YOU ARE A FAILURE OR HAVE LET YOURSELF OR YOUR FAMILY DOWN: MORE THAN HALF THE DAYS
SUM OF ALL RESPONSES TO PHQ QUESTIONS 1-9: 13
8. MOVING OR SPEAKING SO SLOWLY THAT OTHER PEOPLE COULD HAVE NOTICED. OR THE OPPOSITE, BEING SO FIGETY OR RESTLESS THAT YOU HAVE BEEN MOVING AROUND A LOT MORE THAN USUAL: NOT AT ALL
1. LITTLE INTEREST OR PLEASURE IN DOING THINGS: SEVERAL DAYS
SUM OF ALL RESPONSES TO PHQ QUESTIONS 1-9: 13
1. LITTLE INTEREST OR PLEASURE IN DOING THINGS: SEVERAL DAYS
4. FEELING TIRED OR HAVING LITTLE ENERGY: NEARLY EVERY DAY
5. POOR APPETITE OR OVEREATING: SEVERAL DAYS

## 2025-03-18 ENCOUNTER — OFFICE VISIT (OUTPATIENT)
Dept: FAMILY MEDICINE CLINIC | Age: 60
End: 2025-03-18
Payer: COMMERCIAL

## 2025-03-18 VITALS
DIASTOLIC BLOOD PRESSURE: 70 MMHG | BODY MASS INDEX: 47.09 KG/M2 | OXYGEN SATURATION: 97 % | HEIGHT: 66 IN | WEIGHT: 293 LBS | SYSTOLIC BLOOD PRESSURE: 126 MMHG | HEART RATE: 108 BPM

## 2025-03-18 DIAGNOSIS — F51.01 PRIMARY INSOMNIA: ICD-10-CM

## 2025-03-18 DIAGNOSIS — G89.29 CHRONIC BILATERAL LOW BACK PAIN WITH BILATERAL SCIATICA: ICD-10-CM

## 2025-03-18 DIAGNOSIS — M54.41 CHRONIC BILATERAL LOW BACK PAIN WITH BILATERAL SCIATICA: ICD-10-CM

## 2025-03-18 DIAGNOSIS — I10 ESSENTIAL HYPERTENSION: Primary | ICD-10-CM

## 2025-03-18 DIAGNOSIS — M54.42 CHRONIC BILATERAL LOW BACK PAIN WITH BILATERAL SCIATICA: ICD-10-CM

## 2025-03-18 DIAGNOSIS — Z13.6 SCREENING FOR CARDIOVASCULAR CONDITION: ICD-10-CM

## 2025-03-18 DIAGNOSIS — M54.12 CHRONIC CERVICAL RADICULOPATHY: ICD-10-CM

## 2025-03-18 DIAGNOSIS — L65.9 HAIR THINNING: ICD-10-CM

## 2025-03-18 DIAGNOSIS — F41.1 GAD (GENERALIZED ANXIETY DISORDER): ICD-10-CM

## 2025-03-18 PROCEDURE — 99214 OFFICE O/P EST MOD 30 MIN: CPT | Performed by: FAMILY MEDICINE

## 2025-03-18 PROCEDURE — 3078F DIAST BP <80 MM HG: CPT | Performed by: FAMILY MEDICINE

## 2025-03-18 PROCEDURE — 3074F SYST BP LT 130 MM HG: CPT | Performed by: FAMILY MEDICINE

## 2025-03-18 RX ORDER — DULOXETIN HYDROCHLORIDE 20 MG/1
40 CAPSULE, DELAYED RELEASE ORAL DAILY
Qty: 180 CAPSULE | Refills: 0 | Status: SHIPPED | OUTPATIENT
Start: 2025-03-18

## 2025-03-18 RX ORDER — TRAZODONE HYDROCHLORIDE 50 MG/1
50 TABLET ORAL NIGHTLY PRN
Qty: 90 TABLET | Refills: 0 | Status: SHIPPED | OUTPATIENT
Start: 2025-03-18

## 2025-03-18 RX ORDER — BUPROPION HYDROCHLORIDE 300 MG/1
TABLET ORAL
Qty: 90 TABLET | Refills: 3
Start: 2025-03-18

## 2025-03-18 RX ORDER — CICLESONIDE 160 UG/1
AEROSOL, METERED RESPIRATORY (INHALATION)
COMMUNITY
Start: 2024-10-27

## 2025-03-18 NOTE — PROGRESS NOTES
quality of care to you and your family.    If you cannot score us as very good ( 5 Stars) on any question, please feel free to call the office to discuss how we could have made your experience exceptional.     Thank you.    Clinical Care Team:   DO Brayan Soto CMA                                     Triage: Karen Rajan CMA              Clerical Team:    Karen Yang     [place] [phone]           Billing questions: [phone]           Medical Records Request: [phone]          signed by Paige Watkins DO on 3/22/2025 at 9:52 PM  [facility]  [address] [address]  Dept: [phone]

## 2025-03-18 NOTE — PATIENT INSTRUCTIONS
[employer] SURVEY:    You may be receiving a survey from [employer] regarding your visit today.    You may get this in the mail, through your MyChart or in your email.     Please complete the survey to enable us to provide the highest quality of care to you and your family.    If you cannot score us as very good ( 5 Stars) on any question, please feel free to call the office to discuss how we could have made your experience exceptional.     Thank you.    Clinical Care Team:   DO Brayan Soto CMA                                     Triage: Karen Rajan CMA              Clerical Team:    Karen Yang     [place] [phone]           Billing questions: [phone]           Medical Records Request: [phone]

## 2025-03-19 RX ORDER — HYDROCHLOROTHIAZIDE 25 MG/1
25 TABLET ORAL DAILY
Qty: 90 TABLET | Refills: 3 | Status: SHIPPED | OUTPATIENT
Start: 2025-03-19

## 2025-03-19 RX ORDER — LISINOPRIL 40 MG/1
40 TABLET ORAL DAILY
Qty: 90 TABLET | Refills: 3 | Status: SHIPPED | OUTPATIENT
Start: 2025-03-19

## 2025-03-19 RX ORDER — DILTIAZEM HYDROCHLORIDE 240 MG/1
CAPSULE, EXTENDED RELEASE ORAL
Qty: 90 CAPSULE | Refills: 3 | Status: SHIPPED | OUTPATIENT
Start: 2025-03-19

## 2025-03-19 RX ORDER — POTASSIUM CHLORIDE 750 MG/1
TABLET, EXTENDED RELEASE ORAL
Qty: 180 TABLET | Refills: 3 | Status: SHIPPED | OUTPATIENT
Start: 2025-03-19

## 2025-03-19 NOTE — TELEPHONE ENCOUNTER
(REGLAN) 10 MG tablet Take 10 mg by mouth 4 times daily.      Provider, MD Edvin   aspirin 81 MG EC tablet Take 81 mg by mouth daily. Takes 2 daily    Provider, MD Edvin

## 2025-03-25 ENCOUNTER — HOSPITAL ENCOUNTER (OUTPATIENT)
Age: 60
Discharge: HOME | End: 2025-03-25
Payer: COMMERCIAL

## 2025-03-25 DIAGNOSIS — Z13.6 SCREENING FOR CARDIOVASCULAR CONDITION: ICD-10-CM

## 2025-03-25 DIAGNOSIS — L65.9 HAIR THINNING: ICD-10-CM

## 2025-03-25 DIAGNOSIS — I10 ESSENTIAL HYPERTENSION: ICD-10-CM

## 2025-03-25 LAB
ALBUMIN SERPL-MCNC: 4.3 G/DL (ref 3.5–5.2)
ALBUMIN/GLOB SERPL: 1.5 {RATIO} (ref 1–2.5)
ALP SERPL-CCNC: 83 U/L (ref 35–104)
ALT SERPL-CCNC: 15 U/L (ref 5–33)
ANION GAP SERPL CALCULATED.3IONS-SCNC: 11 MMOL/L (ref 9–17)
AST SERPL-CCNC: 21 U/L
BASOPHILS # BLD: 0.04 K/UL (ref 0–0.2)
BASOPHILS NFR BLD: 1 % (ref 0–2)
BILIRUB SERPL-MCNC: 0.6 MG/DL (ref 0.3–1.2)
BUN SERPL-MCNC: 14 MG/DL (ref 8–23)
CALCIUM SERPL-MCNC: 9.3 MG/DL (ref 8.6–10.4)
CHLORIDE SERPL-SCNC: 97 MMOL/L (ref 98–107)
CHOLEST SERPL-MCNC: 212 MG/DL (ref 0–199)
CHOLESTEROL/HDL RATIO: 3.2
CO2 SERPL-SCNC: 27 MMOL/L (ref 20–31)
CREAT SERPL-MCNC: 0.9 MG/DL (ref 0.5–0.9)
EOSINOPHIL # BLD: 0.13 K/UL (ref 0–0.4)
EOSINOPHILS RELATIVE PERCENT: 2 % (ref 0–5)
ERYTHROCYTE [DISTWIDTH] IN BLOOD BY AUTOMATED COUNT: 13.7 % (ref 12.1–15.2)
GFR, ESTIMATED: 73 ML/MIN/1.73M2
GLUCOSE SERPL-MCNC: 126 MG/DL (ref 70–99)
HCT VFR BLD AUTO: 45.2 % (ref 36–46)
HDLC SERPL-MCNC: 67 MG/DL
HGB BLD-MCNC: 15.2 G/DL (ref 12–16)
IMM GRANULOCYTES # BLD AUTO: 0.07 K/UL (ref 0–0.3)
IMM GRANULOCYTES NFR BLD: 1 % (ref 0–5)
LDLC SERPL CALC-MCNC: 120 MG/DL (ref 0–100)
LYMPHOCYTES NFR BLD: 2.04 K/UL (ref 1–4.8)
LYMPHOCYTES RELATIVE PERCENT: 24 % (ref 15–40)
MCH RBC QN AUTO: 29.6 PG (ref 26–34)
MCHC RBC AUTO-ENTMCNC: 33.6 G/DL (ref 31–37)
MCV RBC AUTO: 88.1 FL (ref 80–100)
MONOCYTES NFR BLD: 0.71 K/UL (ref 0–1)
MONOCYTES NFR BLD: 8 % (ref 4–8)
NEUTROPHILS NFR BLD: 64 % (ref 47–75)
NEUTS SEG NFR BLD: 5.64 K/UL (ref 2.5–7)
PLATELET # BLD AUTO: 292 K/UL (ref 140–450)
PMV BLD AUTO: 9.9 FL (ref 6–12)
POTASSIUM SERPL-SCNC: 4 MMOL/L (ref 3.7–5.3)
PROT SERPL-MCNC: 7.2 G/DL (ref 6.4–8.3)
RBC # BLD AUTO: 5.13 M/UL (ref 4–5.2)
SODIUM SERPL-SCNC: 135 MMOL/L (ref 135–144)
TRIGL SERPL-MCNC: 127 MG/DL
TSH SERPL DL<=0.05 MIU/L-ACNC: 1.58 UIU/ML (ref 0.27–4.2)
VLDLC SERPL CALC-MCNC: 25 MG/DL (ref 1–30)
WBC OTHER # BLD: 8.6 K/UL (ref 3.5–11)

## 2025-03-25 PROCEDURE — 84443 ASSAY THYROID STIM HORMONE: CPT

## 2025-03-25 PROCEDURE — 85025 COMPLETE CBC W/AUTO DIFF WBC: CPT

## 2025-03-25 PROCEDURE — 36415 COLL VENOUS BLD VENIPUNCTURE: CPT

## 2025-03-25 PROCEDURE — 80061 LIPID PANEL: CPT

## 2025-03-25 PROCEDURE — 80053 COMPREHEN METABOLIC PANEL: CPT

## 2025-03-27 ENCOUNTER — RESULTS FOLLOW-UP (OUTPATIENT)
Dept: FAMILY MEDICINE CLINIC | Age: 60
End: 2025-03-27

## 2025-03-27 ENCOUNTER — HOSPITAL ENCOUNTER (OUTPATIENT)
Dept: PHYSICAL THERAPY | Age: 60
Setting detail: THERAPIES SERIES
Discharge: HOME | End: 2025-03-27
Attending: FAMILY MEDICINE
Payer: COMMERCIAL

## 2025-03-27 PROCEDURE — 97140 MANUAL THERAPY 1/> REGIONS: CPT

## 2025-03-27 PROCEDURE — 97163 PT EVAL HIGH COMPLEX 45 MIN: CPT

## 2025-03-27 ASSESSMENT — PAIN SCALES - GENERAL: PAINLEVEL_OUTOF10: 6

## 2025-03-27 ASSESSMENT — PAIN DESCRIPTION - LOCATION: LOCATION: BACK;NECK;ARM;LEG

## 2025-03-27 NOTE — THERAPY EVALUATION
Phone: [phone]                       [facility]         Fax: [phone]                      Outpatient Physical Therapy                                                                      Evaluation    Date: 3/27/2025  Patient: Amalia Contreras  : 1965  ACCT #: [id number]  Referring Provider (secondary): Dr. Watkins    Diagnosis: Chronic cervical radiculopathy , Chronic bilateral low back pain with bilateral sciatica    Treatment Diagnosis: Back Pain, Neck pain  Onset Date: 25  PT Insurance Information: Medical [place]-40$ co-pay  Total # of Visits Approved: 8 Per Physician Order  Total # of Visits to Date: 1     Subjective     Additional Pertinent Hx: Patient c/o neck and back pain from DDD and L4-L5 anteriolisthesis. New symptom- neck pain and radiating R arm pain started in last month. C/O sciatic nerve pain in both posterior legs and pain in both buttocks.. Pain at night in legs intermittent, feels jolt in legs, interfers with sleep. Sitting in car or sitting in hard chair aggrivates pain.   Pain varies 4-8/10.  Hx- chronic back pain over 10 yrs,MRI lumbar 5 yrs ago showed L4-L5 4 mm anteriolisthesis; obesity, anxiety, sleep apnea, asthma and recerent bronchitis. 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 Her children and grandchildren live next door and help out when needed.  Pain Assessment  Pain Level: 6  Pain Location: Back, Neck, Arm, Leg  Social/Functional History  Lives With: Alone  Type of Home: House  Occupation: Unemployed       Objective  Spine  Cervical: rotation R 45 degrees, L 50 degrees; sidebending limited 50% bilaterally  Lumbar: Bilateral rotation limited 25%  Joint Mobility  Spine: tightness in upper thoracic and mid thoracic  Strength RLE  Strength RLE: WFL  Strength LLE  Strength LLE: WFL        PROM LLE (degrees)  LLE PROM: Exceptions  L SLR: WFL  L Hip External Rotation (0-45): 50% limited  PROM RLE (degrees)  RLE PROM: Exceptions  R SLR: WFL  R Hip

## 2025-03-27 NOTE — PLAN OF CARE
[facility]       Phone: [phone]   Date: 3/27/2025                      Outpatient Physical Therapy  Fax: [phone]    ACCT #: [id number]                     Plan of Care  [facility]#: [id number]  Patient: Amalia Contreras  : 1965  Referring Provider (secondary): Dr. Watkins    Diagnosis: Chronic cervical radiculopathy , Chronic bilateral low back pain with bilateral sciatica  Onset Date: 25  Treatment Diagnosis: Back Pain, Neck pain    Assessment  Body Structures, Functions, Activity Limitations Requiring Skilled Therapeutic Intervention: Decreased functional mobility , Decreased ROM, Increased pain, Decreased posture  Assessment: Patient presents with chronic back pain and more recent onset of neck pain with R UE radiculopathy. Completed manual therapy and therex per Doc Flow. Educated patient on and issued HEP handout. Plan for therex, HEP, manual therapy.  Therapy Prognosis: Fair    Treatment Plan   Days: 2 times per week Weeks: 4 weeks Total # of Visits Approved: 8    Patient Education/HEP, Back Education, Therapeutic Exercise, Manual Therapy: Myofacial Release/Cupping, and Manual Therapy: Mobilization/Manipulation     Goals  Short Term Goals  Time Frame for Short Term Goals: 6  Short Term Goal 1: Patient to be educated on and independent with HEP  Short Term Goal 2: Improve cervical rotation to 60 degrees bilaterally  Short Term Goal 3: Decrease radicular symptoms with R arm 0/10 pain x3 days  Long Term Goals  Time Frame for Long Term Goals : 8  Long Term Goal 1: Improve functional mobility with Oswestry score <20/50 (from 25/50)  Long Term Goal 2: Decrease neck and upper back pain 2/10 at worst x3 days     Saniya Herrera, PT   Date: 3/27/2025    ______________________________________ Date: 3/27/2025  Physician Signature  By signing above or cosigning electronically, I have reviewed this Plan of Care and certify a need for medically necessary rehabilitation services.

## 2025-04-02 ENCOUNTER — PATIENT MESSAGE (OUTPATIENT)
Dept: FAMILY MEDICINE CLINIC | Age: 60
End: 2025-04-02

## 2025-04-02 ENCOUNTER — HOSPITAL ENCOUNTER (OUTPATIENT)
Dept: PHYSICAL THERAPY | Age: 60
Setting detail: THERAPIES SERIES
Discharge: HOME | End: 2025-04-02
Attending: FAMILY MEDICINE
Payer: COMMERCIAL

## 2025-04-02 PROCEDURE — 97140 MANUAL THERAPY 1/> REGIONS: CPT

## 2025-04-02 PROCEDURE — 97110 THERAPEUTIC EXERCISES: CPT

## 2025-04-02 ASSESSMENT — PAIN SCALES - GENERAL: PAINLEVEL_OUTOF10: 3

## 2025-04-02 NOTE — PROGRESS NOTES
Phone: [phone]                 [facility]      Fax: [phone]                            Outpatient Physical Therapy                                                                            Daily Note    Date: 2025  Patient Name: Amalia Contreras        MRN: [id number]   ACCT#:  [id number]  : 1965  (60 y.o.)    Referring Provider (secondary): Dr. Watkins         Diagnosis: Chronic cervical radiculopathy , Chronic bilateral low back pain with bilateral sciatica  Treatment Diagnosis: Back Pain, Neck pain    Onset Date: 25  PT Insurance Information: Medical [place]-40$ co-pay  Total # of Visits Approved: 8 Per Physician Order  Total # of Visits to Date: 2  Plan of Care/Certification Expiration Date: 25    Pre-Treatment Pain:  3/10     Assessment  Assessment: Pt reports that she is having pain today but the radicular pain on the right side is less than it was. HEP going well 2x/day without complaints. She tolerates her session well with reports of relief of pain at end ofsession. Pt feels she has improved mobility as well as no complaints of pain in right arm.    Plan  Continue with current plan of care    Exercises/Modalities/Manual:  See DocFlow Sheet    Education: HEP    Goals  (Total # of Visits to Date: 2)   Short Term Goals  Time Frame for Short Term Goals: 6  Short Term Goal 1: Patient to be educated on and independent with HEP  Short Term Goal 2: Improve cervical rotation to 60 degrees bilaterally  Short Term Goal 3: Decrease radicular symptoms with R arm 0/10 pain x3 days    Long Term Goals  Time Frame for Long Term Goals : 8  Long Term Goal 1: Improve functional mobility with Oswestry score <20/50 (from 25/50)  Long Term Goal 2: Decrease neck and upper back pain 2/10 at worst x3 days    Treatment Tolerance:  Treatment Tolerance: Tolerated treatment well.    Post Treatment Pain:  2/10    Time In: 1413  Time Out: 1455  Timed Code Treatment Minutes: 42 Minutes  Total

## 2025-04-04 ENCOUNTER — HOSPITAL ENCOUNTER (OUTPATIENT)
Dept: PHYSICAL THERAPY | Age: 60
Setting detail: THERAPIES SERIES
Discharge: HOME | End: 2025-04-04
Attending: FAMILY MEDICINE
Payer: COMMERCIAL

## 2025-04-04 PROCEDURE — 97140 MANUAL THERAPY 1/> REGIONS: CPT

## 2025-04-04 PROCEDURE — 97110 THERAPEUTIC EXERCISES: CPT

## 2025-04-04 ASSESSMENT — PAIN DESCRIPTION - LOCATION: LOCATION: BACK;ARM;NECK;LEG

## 2025-04-04 ASSESSMENT — PAIN SCALES - GENERAL: PAINLEVEL_OUTOF10: 6

## 2025-04-04 NOTE — PROGRESS NOTES
Phone: [phone]                 [facility]      Fax: [phone]                            Outpatient Physical Therapy                                                                            Daily Note    Date: 2025  Patient Name: Amalia Contreras        MRN: [id number]   ACCT#:  [id number]  : 1965  (60 y.o.)  Referring Provider (secondary): Dr. Watkins         Diagnosis: Chronic cervical radiculopathy , Chronic bilateral low back pain with bilateral sciatica  Treatment Diagnosis: Back Pain, Neck pain    Onset Date: 25  PT Insurance Information: Medical [place]-40$ co-pay  Total # of Visits Approved: 8 Per Physician Order  Total # of Visits to Date: 3  Plan of Care/Certification Expiration Date: 25    Pre-Treatment Pain:  6/10     Assessment  Assessment: Pain 6/10 in back and in back of legs. patient reports no change in radicular symptoms R arm.  Completed therex and manual therapy perer Doc Flow. Reviewed HEP with cues.  Educated patient on and issued handout and T-band to progress HEP.    Plan  Continue with current plan of care    Exercises/Modalities/Manual:  See DocFlow Sheet    Education: On ex form, Educated patient on and issued handout and T-band to progress HEP     Goals  (Total # of Visits to Date: 3)   Short Term Goals  Time Frame for Short Term Goals: 6  Short Term Goal 1: Patient to be educated on and independent with HEP  Short Term Goal 2: Improve cervical rotation to 60 degrees bilaterally  Short Term Goal 3: Decrease radicular symptoms with R arm 0/10 pain x3 days    Long Term Goals  Time Frame for Long Term Goals : 8  Long Term Goal 1: Improve functional mobility with Oswestry score <20/50 (from 25/50)  Long Term Goal 2: Decrease neck and upper back pain 2/10 at worst x3 days    Treatment Tolerance:  Treatment Tolerance: Tolerated treatment well.    Post Treatment Pain:  6/10    Time In: 13:47    Time Out : 14:32        Timed Code Treatment Minutes: 45

## 2025-04-11 ENCOUNTER — HOSPITAL ENCOUNTER (OUTPATIENT)
Dept: PHYSICAL THERAPY | Age: 60
Setting detail: THERAPIES SERIES
Discharge: HOME | End: 2025-04-11
Attending: FAMILY MEDICINE
Payer: COMMERCIAL

## 2025-04-11 PROCEDURE — 97140 MANUAL THERAPY 1/> REGIONS: CPT

## 2025-04-11 PROCEDURE — 97110 THERAPEUTIC EXERCISES: CPT

## 2025-04-11 ASSESSMENT — PAIN DESCRIPTION - LOCATION: LOCATION: BACK

## 2025-04-11 ASSESSMENT — PAIN SCALES - GENERAL: PAINLEVEL_OUTOF10: 7

## 2025-04-11 NOTE — PROGRESS NOTES
14:30    Time Out : 15:15        Timed Code Treatment Minutes: 45 Minutes  Total Treatment Time: 45 Minutes    Saniya Herrera, PT     Date: 4/11/2025

## 2025-04-16 ENCOUNTER — HOSPITAL ENCOUNTER (OUTPATIENT)
Dept: PHYSICAL THERAPY | Age: 60
Setting detail: THERAPIES SERIES
Discharge: HOME | End: 2025-04-16
Attending: FAMILY MEDICINE
Payer: COMMERCIAL

## 2025-04-16 PROCEDURE — 97140 MANUAL THERAPY 1/> REGIONS: CPT

## 2025-04-16 PROCEDURE — 97110 THERAPEUTIC EXERCISES: CPT

## 2025-04-16 ASSESSMENT — PAIN SCALES - GENERAL: PAINLEVEL_OUTOF10: 7

## 2025-04-16 ASSESSMENT — PAIN DESCRIPTION - LOCATION: LOCATION: BACK

## 2025-04-16 NOTE — PROGRESS NOTES
Phone: [phone]                 [facility]      Fax: [phone]                            Outpatient Physical Therapy                                                                            Daily Note    Date: 2025  Patient Name: Amalia Contreras        MRN: [id number]   ACCT#:  [id number]  : 1965  (60 y.o.)    Referring Provider (secondary): Dr. Watkins         Diagnosis: Chronic cervical radiculopathy , Chronic bilateral low back pain with bilateral sciatica  Treatment Diagnosis: Back Pain, Neck pain    Onset Date: 25  PT Insurance Information: Medical [place]-40$ co-pay  Total # of Visits Approved: 8 Per Physician Order  Total # of Visits to Date: 5  Plan of Care/Certification Expiration Date: 25    Pre-Treatment Pain:  7/10 back pain     Assessment  Assessment: Pt reports neck pain has improved and has only had pain down the right arm a few times since last session. Increased back pain continues 7/10.  Completed therex and manual therapy per Doc Flow. Lighter pressure with manual today per pt request. Improved tolerance toward session today d/t lighter pressure.    Plan  Continue with current plan of care    Exercises/Modalities/Manual:  See DocFlow Sheet    Education: HEP as tolerated    Goals  (Total # of Visits to Date: 5)   Short Term Goals  Time Frame for Short Term Goals: 6  Short Term Goal 1: Patient to be educated on and independent with HEP-Met  Short Term Goal 2: Improve cervical rotation to 60 degrees bilaterally  Short Term Goal 3: Decrease radicular symptoms with R arm 0/10 pain x3 days    Long Term Goals  Time Frame for Long Term Goals : 8  Long Term Goal 1: Improve functional mobility with Oswestry score <20/50 (from 25/50)  Long Term Goal 2: Decrease neck and upper back pain 2/10 at worst x3 days    Treatment Tolerance:  Treatment Tolerance: Tolerated treatment well.    Post Treatment Pain:  /10    Time In: 1412  Time Out: 1451  Timed Code Treatment

## 2025-04-18 ENCOUNTER — HOSPITAL ENCOUNTER (OUTPATIENT)
Dept: PHYSICAL THERAPY | Age: 60
Setting detail: THERAPIES SERIES
Discharge: HOME | End: 2025-04-18
Attending: FAMILY MEDICINE
Payer: COMMERCIAL

## 2025-04-18 PROCEDURE — 97110 THERAPEUTIC EXERCISES: CPT

## 2025-04-18 PROCEDURE — 97140 MANUAL THERAPY 1/> REGIONS: CPT

## 2025-04-18 ASSESSMENT — PAIN DESCRIPTION - LOCATION: LOCATION: BACK

## 2025-04-18 ASSESSMENT — PAIN SCALES - GENERAL: PAINLEVEL_OUTOF10: 7

## 2025-04-18 NOTE — PROGRESS NOTES
Phone: [phone]                 [facility]      Fax: [phone]                            Outpatient Physical Therapy                                                                            Daily Note    Date: 2025  Patient Name: Amalia Contreras        MRN: [id number]   ACCT#:  [id number]  : 1965  (60 y.o.)    Referring Provider (secondary): Dr. Watkins         Diagnosis: Chronic cervical radiculopathy , Chronic bilateral low back pain with bilateral sciatica  Treatment Diagnosis: Back Pain, Neck pain    Onset Date: 25  PT Insurance Information: Medical [place]-40$ co-pay  Total # of Visits Approved: 8 Per Physician Order  Total # of Visits to Date: 6  Plan of Care/Certification Expiration Date: 25    Pre-Treatment Pain:  7/10     Assessment  Assessment: Pain 7/10 in low back; neck and shld pain 0/10. Completed therex and manual therpy per Doc flow. No radicular pain in arm for several days. Continue per plan, recheck Oswestry next wk.    Plan  Continue with current plan of care    Exercises/Modalities/Manual:  See DocFlow Sheet    Education: On ex form      Goals  (Total # of Visits to Date: 6)   Short Term Goals  Time Frame for Short Term Goals: 6  Short Term Goal 1: Patient to be educated on and independent with HEP-Met  Short Term Goal 2: Improve cervical rotation to 60 degrees bilaterally-Not Met  Short Term Goal 3: Decrease radicular symptoms with R arm 0/10 pain x3 days-Met    Long Term Goals  Time Frame for Long Term Goals : 8  Long Term Goal 1: Improve functional mobility with Oswestry score <20/50 (from 25/50)  Long Term Goal 2: Decrease neck and upper back pain 2/10 at worst x3 days    Treatment Tolerance:  Treatment Tolerance: Tolerated treatment well.    Post Treatment Pain:  6/10    Time In: 14:30    Time Out : 15:10        Timed Code Treatment Minutes: 40 Minutes  Total Treatment Time: 40 Minutes    Saniya Herrera, PT     Date: 2025

## 2025-04-23 ENCOUNTER — HOSPITAL ENCOUNTER (OUTPATIENT)
Dept: PHYSICAL THERAPY | Age: 60
Setting detail: THERAPIES SERIES
Discharge: HOME | End: 2025-04-23
Attending: FAMILY MEDICINE
Payer: COMMERCIAL

## 2025-04-23 PROCEDURE — 97140 MANUAL THERAPY 1/> REGIONS: CPT

## 2025-04-23 PROCEDURE — 97110 THERAPEUTIC EXERCISES: CPT

## 2025-04-23 ASSESSMENT — PAIN SCALES - GENERAL: PAINLEVEL_OUTOF10: 6

## 2025-04-23 ASSESSMENT — PAIN DESCRIPTION - LOCATION: LOCATION: BACK

## 2025-04-23 NOTE — PROGRESS NOTES
Phone: [phone]                 [facility]      Fax: [phone]                            Outpatient Physical Therapy                                                                            Daily Note    Date: 2025  Patient Name: Amalia Contreras        MRN: [id number]   ACCT#:  [id number]  : 1965  (60 y.o.)    Referring Provider (secondary): Dr. Watkins         Diagnosis: Chronic cervical radiculopathy , Chronic bilateral low back pain with bilateral sciatica  Treatment Diagnosis: Back Pain, Neck pain    Onset Date: 25  PT Insurance Information: Medical [place]-40$ co-pay  Total # of Visits Approved: 8 Per Physician Order  Total # of Visits to Date: 7  Plan of Care/Certification Expiration Date: 25    Pre-Treatment Pain:  6/10     Assessment  Assessment: Pt reports pain 6/10 in low back. Shoulder and neck continue to stay minimal with less instances of radicular pain. Added LTR and SKTC to stretch low back d/t continued discomfort. Pt with good tolerance to these added stretches. Pt with one visit left of POC but unable to come back until end of next week d/t scheduling conflicts. Test Owestry next visit.    Plan  Continue with current plan of care    Exercises/Modalities/Manual:  See DocFlow Sheet    Education: HEP    Goals  (Total # of Visits to Date: 7)   Short Term Goals  Time Frame for Short Term Goals: 6  Short Term Goal 1: Patient to be educated on and independent with HEP-Met  Short Term Goal 2: Improve cervical rotation to 60 degrees bilaterally-Not Met  Short Term Goal 3: Decrease radicular symptoms with R arm 0/10 pain x3 days-Met    Long Term Goals  Time Frame for Long Term Goals : 8  Long Term Goal 1: Improve functional mobility with Oswestry score <20/50 (from 25/50)  Long Term Goal 2: Decrease neck and upper back pain 2/10 at worst x3 days  LTG Goal 2 Status:: Met    Treatment Tolerance:  Treatment Tolerance: Tolerated treatment well.    Post Treatment

## 2025-04-25 ENCOUNTER — HOSPITAL ENCOUNTER (OUTPATIENT)
Dept: PHYSICAL THERAPY | Age: 60
Setting detail: THERAPIES SERIES
End: 2025-04-25
Attending: FAMILY MEDICINE
Payer: COMMERCIAL

## 2025-05-02 ENCOUNTER — APPOINTMENT (OUTPATIENT)
Dept: PHYSICAL THERAPY | Age: 60
End: 2025-05-02
Attending: FAMILY MEDICINE
Payer: COMMERCIAL

## 2025-05-06 ENCOUNTER — APPOINTMENT (OUTPATIENT)
Dept: PHYSICAL THERAPY | Age: 60
End: 2025-05-06
Attending: FAMILY MEDICINE
Payer: COMMERCIAL

## 2025-05-08 ENCOUNTER — HOSPITAL ENCOUNTER (OUTPATIENT)
Dept: PHYSICAL THERAPY | Age: 60
Setting detail: THERAPIES SERIES
Discharge: HOME | End: 2025-05-08
Attending: FAMILY MEDICINE
Payer: COMMERCIAL

## 2025-05-08 PROCEDURE — 97110 THERAPEUTIC EXERCISES: CPT

## 2025-05-08 PROCEDURE — 97140 MANUAL THERAPY 1/> REGIONS: CPT

## 2025-05-08 ASSESSMENT — PAIN DESCRIPTION - LOCATION: LOCATION: BACK

## 2025-05-08 ASSESSMENT — PAIN SCALES - GENERAL: PAINLEVEL_OUTOF10: 5

## 2025-05-08 NOTE — PROGRESS NOTES
Phone: [phone]                 [facility]      Fax: [phone]                            Outpatient Physical Therapy                                                                            Daily Note    Date: 2025  Patient Name: Amalia Contreras        MRN: [id number]   ACCT#:  [id number]  : 1965  (60 y.o.)    Referring Provider (secondary): Dr. Watkins         Diagnosis: Chronic cervical radiculopathy , Chronic bilateral low back pain with bilateral sciatica  Treatment Diagnosis: Back Pain, Neck pain    Onset Date: 25  PT Insurance Information: Medical [place]-40$ co-pay  Total # of Visits Approved: 8 Per Physician Order  Total # of Visits to Date: 8  Plan of Care/Certification Expiration Date: 25    Pre-Treatment Pain:  5/10     Assessment  Assessment: Patient reports 0/10 pain in neck and only occasssional symptoms in upper arm Low back pain persist,5/10. Completed therex and manual therapy per Doc flow. Educated patient on and issued sleep hygiene handout. ROM cervical roation R 50 degrees; L rotation WFL. Patient independent with HEP.  Patient follows up with Dr boyle wk, Discharged.    Plan  Discharge    Exercises/Modalities/Manual:  See DocFlow Sheet    Education:    Educated patient on and issued sleep hygiene handout.     Goals  (Total # of Visits to Date: 8)   Short Term Goals  Time Frame for Short Term Goals: 6  Short Term Goal 1: Patient to be educated on and independent with HEP-Met  Short Term Goal 2: Improve cervical rotation to 60 degrees bilaterally-Not Met  Short Term Goal 3: Decrease radicular symptoms with R arm 0/10 pain x3 days-Met    Long Term Goals  Time Frame for Long Term Goals : 8  Long Term Goal 1: Improve functional mobility with Oswestry score <20/50 (from 25/50)-not met  Long Term Goal 2: Decrease neck and upper back pain 2/10 at worst x3 days-Met  LTG Goal 2 Status:: Met    Treatment Tolerance:  Treatment Tolerance: Tolerated treatment

## 2025-05-08 NOTE — DISCHARGE SUMMARY
Phone: [phone]                 [facility]             Fax: [phone]                            Outpatient Physical Therapy                                                                    Discharge Summary    Patient: Amalia Contreras  : 1965  ACCT #: [id number]   Referring Provider (secondary): Dr. Watkins      Diagnosis: Chronic cervical radiculopathy , Chronic bilateral low back pain with bilateral sciatica    Date Treatment Initiated: 3/27/25  Date of Last Treatment: 25    PT Visit Information  Onset Date: 25  PT Insurance Information: Medical [place]-40$ co-pay  Total # of Visits Approved: 8  Total # of Visits to Date: 8    Frequency/Duration  Days: 2 times per week  Weeks: 4 weeks    Treatment Received  Patient Education/HEP, Back Education, Therapeutic Exercise, Manual Therapy: Myofacial Release/Cupping, and Manual Therapy: Mobilization/Manipulation    Assessment: Pain Level: 5  Patient reports 0/10 pain in neck and only occasssional symptoms in upper arm Low back pain persist,5/10.  ROM cervical roation R 50 degrees; L rotation WFL. Patient independent with HEP.  Patient follows up with  next wk, Discharged.       Goals  Short Term Goals  Time Frame for Short Term Goals: 6  Short Term Goal 1: Patient to be educated on and independent with HEP-Met  Short Term Goal 2: Improve cervical rotation to 60 degrees bilaterally-Not Met  Short Term Goal 3: Decrease radicular symptoms with R arm 0/10 pain x3 days-Met    Long Term Goals  Time Frame for Long Term Goals : 8  Long Term Goal 1: Improve functional mobility with Oswestry score <20/50 (from 25/50)-not met  Long Term Goal 2: Decrease neck and upper back pain 2/10 at worst x3 days-Met  LTG Goal 2 Status:: Met    Reason for Discharge  Completion of Prescribed visits and Optimal Function Achieved    Comments:  Thank you for this referral      Saniya Herrera, PT  Date: 2025

## 2025-05-30 ENCOUNTER — OFFICE VISIT (OUTPATIENT)
Dept: FAMILY MEDICINE CLINIC | Age: 60
End: 2025-05-30
Payer: COMMERCIAL

## 2025-05-30 VITALS
DIASTOLIC BLOOD PRESSURE: 70 MMHG | HEART RATE: 85 BPM | HEIGHT: 66 IN | OXYGEN SATURATION: 97 % | SYSTOLIC BLOOD PRESSURE: 124 MMHG | WEIGHT: 293 LBS | BODY MASS INDEX: 47.09 KG/M2

## 2025-05-30 DIAGNOSIS — G89.29 CHRONIC BILATERAL LOW BACK PAIN WITH BILATERAL SCIATICA: Primary | ICD-10-CM

## 2025-05-30 DIAGNOSIS — M54.42 CHRONIC BILATERAL LOW BACK PAIN WITH BILATERAL SCIATICA: Primary | ICD-10-CM

## 2025-05-30 DIAGNOSIS — F51.01 PRIMARY INSOMNIA: ICD-10-CM

## 2025-05-30 DIAGNOSIS — M54.41 CHRONIC BILATERAL LOW BACK PAIN WITH BILATERAL SCIATICA: Primary | ICD-10-CM

## 2025-05-30 DIAGNOSIS — F41.1 GAD (GENERALIZED ANXIETY DISORDER): ICD-10-CM

## 2025-05-30 PROCEDURE — 3078F DIAST BP <80 MM HG: CPT | Performed by: FAMILY MEDICINE

## 2025-05-30 PROCEDURE — 99214 OFFICE O/P EST MOD 30 MIN: CPT | Performed by: FAMILY MEDICINE

## 2025-05-30 PROCEDURE — 3074F SYST BP LT 130 MM HG: CPT | Performed by: FAMILY MEDICINE

## 2025-05-30 RX ORDER — DULOXETIN HYDROCHLORIDE 20 MG/1
20 CAPSULE, DELAYED RELEASE ORAL DAILY
Qty: 90 CAPSULE | Refills: 1 | Status: SHIPPED | OUTPATIENT
Start: 2025-05-30

## 2025-05-30 RX ORDER — TRAZODONE HYDROCHLORIDE 50 MG/1
50 TABLET ORAL NIGHTLY PRN
Qty: 90 TABLET | Refills: 1 | Status: SHIPPED | OUTPATIENT
Start: 2025-05-30

## 2025-05-30 NOTE — PROGRESS NOTES
Name: Amalia Contreras  : 1965         Chief Complaint:     Chief Complaint   Patient presents with    Hypertension    Anxiety     Mood improved.        History of Present Illness:      Amalia Contreras is a 60 y.o.  female who presents with Hypertension and Anxiety (Mood improved. )      HPI    Anxiety and depression a lot better on cymbalta as compared to wellbutrin. Taking just 20 mg which seems to be enough. Sleep has improved but still struggles sometimes, takes trazodone maybe once every week or so. Finds 1/2 tab doesn't work but 1 tab works well, does have some dopey feelings in the morning, doesn't take it if she'll have to drive early in the morning.    Low back pain and sciatica continues. PT was pretty difficult, made some things worse though also seemed to help make her stronger and more flexible. Also made adjustment to her chair at home per PT rec which has helped. Pain down both posterior lower ext and R buttock, was told piriformis very inflamed. Ices back every night which helps some. Tylenol prn. Continues home exercises.     PT did help with neck and RUE pain, doing better with that.     Medical History:     Patient Active Problem List   Diagnosis    HTN (hypertension)    Asthma    Dysthymia    History of colon polyps       Medications:       Prior to Admission medications    Medication Sig Start Date End Date Taking? Authorizing Provider   traZODone (DESYREL) 50 MG tablet Take 1 tablet by mouth nightly as needed for Sleep 25  Yes Paige Watkins DO   DULoxetine (CYMBALTA) 20 MG extended release capsule Take 1 capsule by mouth daily 25  Yes Paige Watkins DO   dilTIAZem (DILACOR XR) 240 MG extended release capsule TAKE 1 CAPSULE DAILY 3/19/25  Yes Paige Watkins DO   potassium chloride (KLOR-CON M) 10 MEQ extended release tablet TAKE 1 TABLET TWICE A DAY 3/19/25  Yes Paige Watkins, DO   lisinopril (PRINIVIL;ZESTRIL) 40 MG tablet Take 1 tablet by mouth daily

## 2025-05-30 NOTE — PATIENT INSTRUCTIONS
[employer] SURVEY:    You may be receiving a survey from [employer] regarding your visit today.    You may get this in the mail, through your MyChart or in your email.     Please complete the survey to enable us to provide the highest quality of care to you and your family.      Thank you.    Clinical Care Team:   Dr. Paige Watkins, DO Brayan Casey CMA                                     Triage: Karen Rajan CMA              Clerical Team:    Kraen Yang     [place] [phone]           Billing questions: [phone]           Medical Records Request: [phone]

## 2025-06-19 ENCOUNTER — HOSPITAL ENCOUNTER (OUTPATIENT)
Dept: GENERAL RADIOLOGY | Age: 60
Discharge: HOME | End: 2025-06-21
Payer: COMMERCIAL

## 2025-06-19 DIAGNOSIS — M54.41 CHRONIC BILATERAL LOW BACK PAIN WITH BILATERAL SCIATICA: ICD-10-CM

## 2025-06-19 DIAGNOSIS — M54.42 CHRONIC BILATERAL LOW BACK PAIN WITH BILATERAL SCIATICA: ICD-10-CM

## 2025-06-19 DIAGNOSIS — G89.29 CHRONIC BILATERAL LOW BACK PAIN WITH BILATERAL SCIATICA: ICD-10-CM

## 2025-06-19 PROCEDURE — 72110 X-RAY EXAM L-2 SPINE 4/>VWS: CPT

## 2025-06-20 ENCOUNTER — RESULTS FOLLOW-UP (OUTPATIENT)
Dept: FAMILY MEDICINE CLINIC | Age: 60
End: 2025-06-20

## 2025-06-20 DIAGNOSIS — M54.42 CHRONIC BILATERAL LOW BACK PAIN WITH BILATERAL SCIATICA: Primary | ICD-10-CM

## 2025-06-20 DIAGNOSIS — M54.41 CHRONIC BILATERAL LOW BACK PAIN WITH BILATERAL SCIATICA: Primary | ICD-10-CM

## 2025-06-20 DIAGNOSIS — G89.29 CHRONIC BILATERAL LOW BACK PAIN WITH BILATERAL SCIATICA: Primary | ICD-10-CM

## 2025-06-20 DIAGNOSIS — M43.10 ANTEROLISTHESIS: ICD-10-CM

## 2025-07-16 ENCOUNTER — HOSPITAL ENCOUNTER (OUTPATIENT)
Dept: MRI IMAGING | Age: 60
Discharge: HOME | End: 2025-07-18
Attending: FAMILY MEDICINE
Payer: COMMERCIAL

## 2025-07-16 DIAGNOSIS — M43.10 ANTEROLISTHESIS: ICD-10-CM

## 2025-07-16 DIAGNOSIS — M54.42 CHRONIC BILATERAL LOW BACK PAIN WITH BILATERAL SCIATICA: ICD-10-CM

## 2025-07-16 DIAGNOSIS — G89.29 CHRONIC BILATERAL LOW BACK PAIN WITH BILATERAL SCIATICA: ICD-10-CM

## 2025-07-16 DIAGNOSIS — M54.41 CHRONIC BILATERAL LOW BACK PAIN WITH BILATERAL SCIATICA: ICD-10-CM

## 2025-07-16 PROCEDURE — 72148 MRI LUMBAR SPINE W/O DYE: CPT

## 2025-07-17 ENCOUNTER — OFFICE VISIT (OUTPATIENT)
Dept: FAMILY MEDICINE CLINIC | Age: 60
End: 2025-07-17
Payer: COMMERCIAL

## 2025-07-17 VITALS
HEIGHT: 66 IN | SYSTOLIC BLOOD PRESSURE: 118 MMHG | BODY MASS INDEX: 47.09 KG/M2 | WEIGHT: 293 LBS | HEART RATE: 75 BPM | DIASTOLIC BLOOD PRESSURE: 60 MMHG | OXYGEN SATURATION: 96 %

## 2025-07-17 DIAGNOSIS — R30.0 BURNING WITH URINATION: ICD-10-CM

## 2025-07-17 DIAGNOSIS — N39.3 STRESS INCONTINENCE: ICD-10-CM

## 2025-07-17 DIAGNOSIS — R33.9 INCOMPLETE BLADDER EMPTYING: Primary | ICD-10-CM

## 2025-07-17 DIAGNOSIS — R35.0 URINARY FREQUENCY: ICD-10-CM

## 2025-07-17 LAB
BILIRUBIN, POC: NEGATIVE
BLOOD URINE, POC: NORMAL
CLARITY, POC: CLEAR
COLOR, POC: YELLOW
GLUCOSE URINE, POC: NEGATIVE MG/DL
KETONES, POC: NEGATIVE MG/DL
LEUKOCYTE EST, POC: NEGATIVE
NITRITE, POC: NEGATIVE
PH, POC: 7.5
PROTEIN, POC: NEGATIVE MG/DL
SPECIFIC GRAVITY, POC: 1.01
UROBILINOGEN, POC: 0.2 MG/DL

## 2025-07-17 PROCEDURE — 3078F DIAST BP <80 MM HG: CPT | Performed by: NURSE PRACTITIONER

## 2025-07-17 PROCEDURE — 81002 URINALYSIS NONAUTO W/O SCOPE: CPT | Performed by: NURSE PRACTITIONER

## 2025-07-17 PROCEDURE — 99213 OFFICE O/P EST LOW 20 MIN: CPT | Performed by: NURSE PRACTITIONER

## 2025-07-17 PROCEDURE — 3074F SYST BP LT 130 MM HG: CPT | Performed by: NURSE PRACTITIONER

## 2025-07-17 RX ORDER — OXYBUTYNIN CHLORIDE 5 MG/1
5 TABLET, EXTENDED RELEASE ORAL DAILY
Qty: 30 TABLET | Refills: 0 | Status: SHIPPED | OUTPATIENT
Start: 2025-07-17

## 2025-07-17 RX ORDER — MOMETASONE FUROATE 200 UG/1
1 AEROSOL RESPIRATORY (INHALATION) 2 TIMES DAILY
COMMUNITY
Start: 2025-06-24

## 2025-07-17 ASSESSMENT — PATIENT HEALTH QUESTIONNAIRE - PHQ9
SUM OF ALL RESPONSES TO PHQ QUESTIONS 1-9: 0
2. FEELING DOWN, DEPRESSED OR HOPELESS: NOT AT ALL
SUM OF ALL RESPONSES TO PHQ QUESTIONS 1-9: 0
1. LITTLE INTEREST OR PLEASURE IN DOING THINGS: NOT AT ALL
SUM OF ALL RESPONSES TO PHQ QUESTIONS 1-9: 0
SUM OF ALL RESPONSES TO PHQ QUESTIONS 1-9: 0

## 2025-07-17 ASSESSMENT — ENCOUNTER SYMPTOMS
COUGH: 0
DIARRHEA: 0
NAUSEA: 0
SHORTNESS OF BREATH: 0
VOMITING: 0

## 2025-07-17 NOTE — PATIENT INSTRUCTIONS
SURVEY:    You may be receiving a survey from [facility] regarding your visit today.    You may get this in the mail, through your MyChart or in your email.     Please complete the survey to enable us to provide the highest quality of care to you and your family.      Thank you.    Clinical Care Team:         VINNIE Hinkle-YOHANNES Cabrera                         Triage:         YOHANNES Mosqueda    Clerical Team:      Karen Yang       [place] [phone]           Billing questions: [phone]           Medical Records Request: [phone]

## 2025-07-17 NOTE — PROGRESS NOTES
CO2 27 03/25/2025 09:54 AM    BUN 14 03/25/2025 09:54 AM    CREATININE 0.9 03/25/2025 09:54 AM    GLUCOSE 126 03/25/2025 09:54 AM    GLUCOSE 101 11/03/2011 09:43 AM    BILITOT 0.6 03/25/2025 09:54 AM    ALKPHOS 83 03/25/2025 09:54 AM    AST 21 03/25/2025 09:54 AM    ALT 15 03/25/2025 09:54 AM     Lab Results   Component Value Date/Time    WBC 8.6 03/25/2025 09:54 AM    RBC 5.13 03/25/2025 09:54 AM    HGB 15.2 03/25/2025 09:54 AM    HCT 45.2 03/25/2025 09:54 AM    MCV 88.1 03/25/2025 09:54 AM    MCH 29.6 03/25/2025 09:54 AM    MCHC 33.6 03/25/2025 09:54 AM    RDW 13.7 03/25/2025 09:54 AM     03/25/2025 09:54 AM    MPV 9.9 03/25/2025 09:54 AM     Lab Results   Component Value Date/Time    TSH 1.58 03/25/2025 09:54 AM     Lab Results   Component Value Date/Time    CHOL 212 03/25/2025 09:54 AM     03/25/2025 09:54 AM    HDL 67 03/25/2025 09:54 AM    LABA1C 5.5 05/02/2024 01:03 PM          Assessment & Plan        Diagnosis Orders   1. Incomplete bladder emptying        2. Urinary frequency  POCT Urinalysis no Micro      3. Stress incontinence        4. Burning with urination  POCT Urinalysis no Micro        Will start on oxybutynin.  Patient educated about medication.  If this does not improve symptoms, will order CT to look at the anatomy of the bladder.              Completed Refills   Requested Prescriptions     Signed Prescriptions Disp Refills    oxyBUTYnin (DITROPAN-XL) 5 MG extended release tablet 30 tablet 0     Sig: Take 1 tablet by mouth daily     Return in about 4 weeks (around 8/14/2025) for Recheck Oxybutynin.  Orders Placed This Encounter   Medications    oxyBUTYnin (DITROPAN-XL) 5 MG extended release tablet     Sig: Take 1 tablet by mouth daily     Dispense:  30 tablet     Refill:  0     Orders Placed This Encounter   Procedures    POCT Urinalysis no Micro         Patient Instructions   SURVEY:    You may be receiving a survey from Keira Cornelius regarding your visit today.    You may get this

## 2025-07-21 ENCOUNTER — RESULTS FOLLOW-UP (OUTPATIENT)
Dept: FAMILY MEDICINE CLINIC | Age: 60
End: 2025-07-21

## 2025-07-21 DIAGNOSIS — G89.29 CHRONIC BILATERAL LOW BACK PAIN WITH BILATERAL SCIATICA: Primary | ICD-10-CM

## 2025-07-21 DIAGNOSIS — M54.41 CHRONIC BILATERAL LOW BACK PAIN WITH BILATERAL SCIATICA: Primary | ICD-10-CM

## 2025-07-21 DIAGNOSIS — M54.42 CHRONIC BILATERAL LOW BACK PAIN WITH BILATERAL SCIATICA: Primary | ICD-10-CM

## 2025-07-21 RX ORDER — SULFAMETHOXAZOLE AND TRIMETHOPRIM 800; 160 MG/1; MG/1
1 TABLET ORAL 2 TIMES DAILY
Qty: 14 TABLET | Refills: 0 | Status: SHIPPED | OUTPATIENT
Start: 2025-07-21 | End: 2025-07-28

## 2025-07-21 NOTE — RESULT ENCOUNTER NOTE
Carlton Holland. Your MRI showed a couple of areas that have severe arthritis causing narrowing around nerves on both sides. Dr. Watkins recommends seeing pain management to see about any procedure or injection that could help with this. Please let me know your thoughts and if your are ok to be referred to pain management.

## 2025-08-03 ENCOUNTER — PATIENT MESSAGE (OUTPATIENT)
Dept: FAMILY MEDICINE CLINIC | Age: 60
End: 2025-08-03

## 2025-08-03 DIAGNOSIS — N39.3 STRESS INCONTINENCE: ICD-10-CM

## 2025-08-03 DIAGNOSIS — R35.0 URINARY FREQUENCY: ICD-10-CM

## 2025-08-03 DIAGNOSIS — R33.9 INCOMPLETE BLADDER EMPTYING: Primary | ICD-10-CM

## 2025-08-05 ENCOUNTER — HOSPITAL ENCOUNTER (OUTPATIENT)
Dept: ULTRASOUND IMAGING | Age: 60
Discharge: HOME | End: 2025-08-07
Payer: COMMERCIAL

## 2025-08-05 DIAGNOSIS — N39.3 STRESS INCONTINENCE: ICD-10-CM

## 2025-08-05 DIAGNOSIS — R35.0 URINARY FREQUENCY: ICD-10-CM

## 2025-08-05 DIAGNOSIS — R33.9 INCOMPLETE BLADDER EMPTYING: ICD-10-CM

## 2025-08-05 PROCEDURE — 76775 US EXAM ABDO BACK WALL LIM: CPT | Performed by: FAMILY MEDICINE

## 2025-08-06 ENCOUNTER — RESULTS FOLLOW-UP (OUTPATIENT)
Dept: FAMILY MEDICINE CLINIC | Age: 60
End: 2025-08-06

## 2025-08-06 ENCOUNTER — HOSPITAL ENCOUNTER (OUTPATIENT)
Dept: LAB | Age: 60
Discharge: HOME | End: 2025-08-06
Payer: COMMERCIAL

## 2025-08-06 DIAGNOSIS — R33.9 INCOMPLETE BLADDER EMPTYING: ICD-10-CM

## 2025-08-06 DIAGNOSIS — R35.0 URINARY FREQUENCY: ICD-10-CM

## 2025-08-06 DIAGNOSIS — R35.0 URINARY FREQUENCY: Primary | ICD-10-CM

## 2025-08-06 PROCEDURE — 87086 URINE CULTURE/COLONY COUNT: CPT

## 2025-08-07 LAB
MICROORGANISM SPEC CULT: NORMAL
SERVICE CMNT-IMP: NORMAL
SPECIMEN DESCRIPTION: NORMAL

## 2025-08-08 ENCOUNTER — RESULTS FOLLOW-UP (OUTPATIENT)
Dept: FAMILY MEDICINE CLINIC | Age: 60
End: 2025-08-08

## 2025-08-08 DIAGNOSIS — R39.14 FEELING OF INCOMPLETE BLADDER EMPTYING: ICD-10-CM

## 2025-08-08 DIAGNOSIS — N39.3 STRESS INCONTINENCE: ICD-10-CM

## 2025-08-08 DIAGNOSIS — R35.0 URINARY FREQUENCY: Primary | ICD-10-CM

## 2025-09-04 ENCOUNTER — OFFICE VISIT (OUTPATIENT)
Dept: PAIN MANAGEMENT | Age: 60
End: 2025-09-04
Payer: COMMERCIAL

## 2025-09-04 VITALS
DIASTOLIC BLOOD PRESSURE: 60 MMHG | BODY MASS INDEX: 59.11 KG/M2 | WEIGHT: 293 LBS | RESPIRATION RATE: 18 BRPM | SYSTOLIC BLOOD PRESSURE: 122 MMHG

## 2025-09-04 DIAGNOSIS — M54.16 LUMBAR RADICULOPATHY: Primary | ICD-10-CM

## 2025-09-04 DIAGNOSIS — M47.817 LUMBOSACRAL SPONDYLOSIS WITHOUT MYELOPATHY: ICD-10-CM

## 2025-09-04 DIAGNOSIS — M79.18 MYOFASCIAL PAIN SYNDROME: ICD-10-CM

## 2025-09-04 DIAGNOSIS — M54.16 LUMBAR RADICULOPATHY: ICD-10-CM

## 2025-09-04 DIAGNOSIS — M51.369 DEGENERATION OF INTERVERTEBRAL DISC OF LUMBAR REGION, UNSPECIFIED WHETHER PAIN PRESENT: ICD-10-CM

## 2025-09-04 DIAGNOSIS — E66.813 CLASS 3 SEVERE OBESITY WITH BODY MASS INDEX (BMI) OF 50.0 TO 59.9 IN ADULT, UNSPECIFIED OBESITY TYPE, UNSPECIFIED WHETHER SERIOUS COMORBIDITY PRESENT (HCC): ICD-10-CM

## 2025-09-04 PROCEDURE — 99204 OFFICE O/P NEW MOD 45 MIN: CPT | Performed by: STUDENT IN AN ORGANIZED HEALTH CARE EDUCATION/TRAINING PROGRAM

## 2025-09-04 PROCEDURE — 3078F DIAST BP <80 MM HG: CPT | Performed by: STUDENT IN AN ORGANIZED HEALTH CARE EDUCATION/TRAINING PROGRAM

## 2025-09-04 PROCEDURE — 3074F SYST BP LT 130 MM HG: CPT | Performed by: STUDENT IN AN ORGANIZED HEALTH CARE EDUCATION/TRAINING PROGRAM

## (undated) DEVICE — JELLY LUBRICATING 4OZ FLIP TOP TB E Z

## (undated) DEVICE — MEDI-VAC NON-CONDUCTIVE SUCTION TUBING 6MM X 6.1M (20 FT.) L: Brand: CARDINAL HEALTH

## (undated) DEVICE — GOWN,AURORA,NONRNF,XL,30/CS: Brand: MEDLINE

## (undated) DEVICE — FORCEPS BX L240CM JAW DIA2.2MM RAD JAW 4 HOT DISP